# Patient Record
Sex: MALE | Race: WHITE | NOT HISPANIC OR LATINO | Employment: OTHER | URBAN - NONMETROPOLITAN AREA
[De-identification: names, ages, dates, MRNs, and addresses within clinical notes are randomized per-mention and may not be internally consistent; named-entity substitution may affect disease eponyms.]

---

## 2017-02-21 RX ORDER — BETAXOLOL HYDROCHLORIDE 5 MG/ML
2 SOLUTION/ DROPS OPHTHALMIC ONCE
Status: CANCELLED | OUTPATIENT
Start: 2017-02-21 | End: 2017-02-21

## 2017-02-21 RX ORDER — MELOXICAM 15 MG/1
15 TABLET ORAL DAILY
COMMUNITY

## 2017-02-21 RX ORDER — CYCLOBENZAPRINE HCL 10 MG
10 TABLET ORAL 3 TIMES DAILY PRN
COMMUNITY

## 2017-02-28 ENCOUNTER — ANESTHESIA (OUTPATIENT)
Dept: PERIOP | Facility: HOSPITAL | Age: 52
End: 2017-02-28

## 2017-02-28 ENCOUNTER — ANESTHESIA EVENT (OUTPATIENT)
Dept: PERIOP | Facility: HOSPITAL | Age: 52
End: 2017-02-28

## 2017-02-28 ENCOUNTER — HOSPITAL ENCOUNTER (OUTPATIENT)
Facility: HOSPITAL | Age: 52
Setting detail: HOSPITAL OUTPATIENT SURGERY
Discharge: HOME | End: 2017-02-28
Attending: OPHTHALMOLOGY | Admitting: OPHTHALMOLOGY

## 2017-02-28 VITALS
WEIGHT: 200 LBS | HEIGHT: 68 IN | BODY MASS INDEX: 30.31 KG/M2 | OXYGEN SATURATION: 97 % | SYSTOLIC BLOOD PRESSURE: 133 MMHG | HEART RATE: 81 BPM | TEMPERATURE: 98.2 F | RESPIRATION RATE: 18 BRPM | DIASTOLIC BLOOD PRESSURE: 81 MMHG

## 2017-02-28 PROBLEM — H26.9 CATARACT OF RIGHT EYE: Status: ACTIVE | Noted: 2017-02-28

## 2017-02-28 PROBLEM — H26.9 CATARACT OF RIGHT EYE: Status: RESOLVED | Noted: 2017-02-28 | Resolved: 2017-02-28

## 2017-02-28 LAB — GLUCOSE BLDC GLUCOMTR-MCNC: 124 MG/DL (ref 70–130)

## 2017-02-28 PROCEDURE — 25010000002 MIDAZOLAM PER 1 MG: Performed by: NURSE ANESTHETIST, CERTIFIED REGISTERED

## 2017-02-28 PROCEDURE — 82962 GLUCOSE BLOOD TEST: CPT

## 2017-02-28 PROCEDURE — 25010000002 EPINEPHRINE 1 MG/ML SOLUTION: Performed by: OPHTHALMOLOGY

## 2017-02-28 PROCEDURE — C1780 LENS, INTRAOCULAR (NEW TECH): HCPCS | Performed by: OPHTHALMOLOGY

## 2017-02-28 DEVICE — LENS ACRYSOF IQ 6X13MM SN60WF 19.5: Type: IMPLANTABLE DEVICE | Site: POSTERIOR CHAMBER | Status: FUNCTIONAL

## 2017-02-28 RX ORDER — SODIUM CHLORIDE 0.9 % (FLUSH) 0.9 %
1-10 SYRINGE (ML) INJECTION AS NEEDED
Status: DISCONTINUED | OUTPATIENT
Start: 2017-02-28 | End: 2017-02-28 | Stop reason: HOSPADM

## 2017-02-28 RX ORDER — PHENYLEPHRINE HYDROCHLORIDE 100 MG/ML
SOLUTION/ DROPS OPHTHALMIC
Status: COMPLETED
Start: 2017-02-28 | End: 2017-02-28

## 2017-02-28 RX ORDER — BALANCED SALT SOLUTION 6.4; .75; .48; .3; 3.9; 1.7 MG/ML; MG/ML; MG/ML; MG/ML; MG/ML; MG/ML
SOLUTION OPHTHALMIC AS NEEDED
Status: DISCONTINUED | OUTPATIENT
Start: 2017-02-28 | End: 2017-02-28 | Stop reason: HOSPADM

## 2017-02-28 RX ORDER — BALANCED SALT SOLUTION 6.4; .75; .48; .3; 3.9; 1.7 MG/ML; MG/ML; MG/ML; MG/ML; MG/ML; MG/ML
SOLUTION OPHTHALMIC
Status: DISCONTINUED
Start: 2017-02-28 | End: 2017-02-28 | Stop reason: HOSPADM

## 2017-02-28 RX ORDER — MIDAZOLAM HYDROCHLORIDE 1 MG/ML
INJECTION INTRAMUSCULAR; INTRAVENOUS AS NEEDED
Status: DISCONTINUED | OUTPATIENT
Start: 2017-02-28 | End: 2017-02-28 | Stop reason: SURG

## 2017-02-28 RX ORDER — PREDNISOLONE ACETATE 10 MG/ML
SUSPENSION/ DROPS OPHTHALMIC
Status: DISCONTINUED
Start: 2017-02-28 | End: 2017-02-28 | Stop reason: HOSPADM

## 2017-02-28 RX ORDER — SODIUM CHLORIDE 0.9 % (FLUSH) 0.9 %
3 SYRINGE (ML) INJECTION AS NEEDED
Status: DISCONTINUED | OUTPATIENT
Start: 2017-02-28 | End: 2017-02-28 | Stop reason: HOSPADM

## 2017-02-28 RX ORDER — LIDOCAINE HYDROCHLORIDE 40 MG/ML
INJECTION, SOLUTION RETROBULBAR; TOPICAL AS NEEDED
Status: DISCONTINUED | OUTPATIENT
Start: 2017-02-28 | End: 2017-02-28 | Stop reason: HOSPADM

## 2017-02-28 RX ORDER — LIDOCAINE HYDROCHLORIDE 40 MG/ML
INJECTION, SOLUTION RETROBULBAR; TOPICAL
Status: DISCONTINUED
Start: 2017-02-28 | End: 2017-02-28 | Stop reason: HOSPADM

## 2017-02-28 RX ORDER — ENALAPRILAT 2.5 MG/2ML
1.25 INJECTION INTRAVENOUS ONCE AS NEEDED
Status: DISCONTINUED | OUTPATIENT
Start: 2017-02-28 | End: 2017-02-28 | Stop reason: HOSPADM

## 2017-02-28 RX ORDER — PILOCARPINE HYDROCHLORIDE 10 MG/ML
SOLUTION/ DROPS OPHTHALMIC
Status: DISCONTINUED
Start: 2017-02-28 | End: 2017-02-28 | Stop reason: HOSPADM

## 2017-02-28 RX ORDER — TETRACAINE HYDROCHLORIDE 5 MG/ML
SOLUTION OPHTHALMIC
Status: COMPLETED
Start: 2017-02-28 | End: 2017-02-28

## 2017-02-28 RX ORDER — EPINEPHRINE 1 MG/ML
INJECTION INTRAMUSCULAR; INTRAVENOUS; SUBCUTANEOUS AS NEEDED
Status: DISCONTINUED | OUTPATIENT
Start: 2017-02-28 | End: 2017-02-28 | Stop reason: HOSPADM

## 2017-02-28 RX ORDER — TETRACAINE HYDROCHLORIDE 5 MG/ML
1 SOLUTION OPHTHALMIC SEE ADMIN INSTRUCTIONS
Status: COMPLETED | OUTPATIENT
Start: 2017-02-28 | End: 2017-02-28

## 2017-02-28 RX ORDER — PREDNISOLONE ACETATE 10 MG/ML
1 SUSPENSION/ DROPS OPHTHALMIC
Status: DISCONTINUED | OUTPATIENT
Start: 2017-02-28 | End: 2017-02-28 | Stop reason: HOSPADM

## 2017-02-28 RX ORDER — PREDNISOLONE ACETATE 10 MG/ML
SUSPENSION/ DROPS OPHTHALMIC AS NEEDED
Status: DISCONTINUED | OUTPATIENT
Start: 2017-02-28 | End: 2017-02-28 | Stop reason: HOSPADM

## 2017-02-28 RX ORDER — PHENYLEPHRINE HYDROCHLORIDE 100 MG/ML
1 SOLUTION/ DROPS OPHTHALMIC
Status: COMPLETED | OUTPATIENT
Start: 2017-02-28 | End: 2017-02-28

## 2017-02-28 RX ORDER — CYCLOPENTOLATE HYDROCHLORIDE 20 MG/ML
SOLUTION/ DROPS OPHTHALMIC
Status: COMPLETED
Start: 2017-02-28 | End: 2017-02-28

## 2017-02-28 RX ORDER — CYCLOPENTOLATE HYDROCHLORIDE 20 MG/ML
1 SOLUTION/ DROPS OPHTHALMIC
Status: COMPLETED | OUTPATIENT
Start: 2017-02-28 | End: 2017-02-28

## 2017-02-28 RX ORDER — PILOCARPINE HYDROCHLORIDE 10 MG/ML
SOLUTION/ DROPS OPHTHALMIC AS NEEDED
Status: DISCONTINUED | OUTPATIENT
Start: 2017-02-28 | End: 2017-02-28 | Stop reason: HOSPADM

## 2017-02-28 RX ORDER — SODIUM CHLORIDE, SODIUM LACTATE, POTASSIUM CHLORIDE, CALCIUM CHLORIDE 600; 310; 30; 20 MG/100ML; MG/100ML; MG/100ML; MG/100ML
1000 INJECTION, SOLUTION INTRAVENOUS CONTINUOUS PRN
Status: DISCONTINUED | OUTPATIENT
Start: 2017-02-28 | End: 2017-02-28 | Stop reason: HOSPADM

## 2017-02-28 RX ADMIN — PHENYLEPHRINE HYDROCHLORIDE 1 DROP: 100 SOLUTION/ DROPS OPHTHALMIC at 10:57

## 2017-02-28 RX ADMIN — MIDAZOLAM HYDROCHLORIDE 1 MG: 1 INJECTION, SOLUTION INTRAMUSCULAR; INTRAVENOUS at 11:45

## 2017-02-28 RX ADMIN — CYCLOPENTOLATE HYDROCHLORIDE 1 DROP: 20 SOLUTION/ DROPS OPHTHALMIC at 10:57

## 2017-02-28 RX ADMIN — TETRACAINE HYDROCHLORIDE 1 DROP: 5 SOLUTION OPHTHALMIC at 10:51

## 2017-02-28 RX ADMIN — PHENYLEPHRINE HYDROCHLORIDE 1 DROP: 100 SOLUTION/ DROPS OPHTHALMIC at 10:52

## 2017-02-28 RX ADMIN — TETRACAINE HYDROCHLORIDE 1 DROP: 5 SOLUTION OPHTHALMIC at 10:50

## 2017-02-28 RX ADMIN — SODIUM CHLORIDE, POTASSIUM CHLORIDE, SODIUM LACTATE AND CALCIUM CHLORIDE: 600; 310; 30; 20 INJECTION, SOLUTION INTRAVENOUS at 11:19

## 2017-02-28 RX ADMIN — MIDAZOLAM HYDROCHLORIDE 1 MG: 1 INJECTION, SOLUTION INTRAMUSCULAR; INTRAVENOUS at 11:34

## 2017-02-28 RX ADMIN — CYCLOPENTOLATE HYDROCHLORIDE 1 DROP: 20 SOLUTION/ DROPS OPHTHALMIC at 11:02

## 2017-02-28 RX ADMIN — MIDAZOLAM HYDROCHLORIDE 1 MG: 1 INJECTION, SOLUTION INTRAMUSCULAR; INTRAVENOUS at 11:30

## 2017-02-28 RX ADMIN — CYCLOPENTOLATE HYDROCHLORIDE 1 DROP: 20 SOLUTION/ DROPS OPHTHALMIC at 10:52

## 2017-02-28 RX ADMIN — MIDAZOLAM HYDROCHLORIDE 1 MG: 1 INJECTION, SOLUTION INTRAMUSCULAR; INTRAVENOUS at 11:25

## 2017-02-28 RX ADMIN — SODIUM CHLORIDE, POTASSIUM CHLORIDE, SODIUM LACTATE AND CALCIUM CHLORIDE 1000 ML: 600; 310; 30; 20 INJECTION, SOLUTION INTRAVENOUS at 10:56

## 2017-02-28 RX ADMIN — PHENYLEPHRINE HYDROCHLORIDE 1 DROP: 100 SOLUTION/ DROPS OPHTHALMIC at 11:02

## 2017-02-28 NOTE — ANESTHESIA POSTPROCEDURE EVALUATION
Patient: Edy Pinto    Procedure Summary     Date Anesthesia Start Anesthesia Stop Room / Location    02/28/17 1120 1148  [facility]       Procedure Diagnosis Surgeon Provider    RIGHT CATARACT PHACO EXTRACTION WITH INTRAOCULAR LENS IMPLANT (Right Eye) No diagnosis on file. MD Onur Mathias CRNA          Anesthesia Type: MAC  Last vitals  BP      Temp      Pulse     Resp      SpO2        Post Anesthesia Care and Evaluation    Patient location during evaluation: bedside  Patient participation: complete - patient participated  Level of consciousness: awake and alert  Pain score: 0  Pain management: satisfactory to patient  Airway patency: patent  Anesthetic complications: No anesthetic complications  PONV Status: none  Cardiovascular status: acceptable and hemodynamically stable  Respiratory status: acceptable  Hydration status: acceptable

## 2017-02-28 NOTE — ANESTHESIA PREPROCEDURE EVALUATION
Anesthesia Evaluation     Patient summary reviewed and Nursing notes reviewed   no history of anesthetic complications:  NPO Status: > 8 hours   Airway   Mallampati: II  TM distance: <3 FB  no difficulty expected and possible difficult intubation  Dental    (+) edentulous    Pulmonary    (+) a smoker Former, COPD mild, asthma ( as child), shortness of breath, decreased breath sounds,   Cardiovascular - normal exam  Exercise tolerance: good (4-7 METS)        Neuro/Psych  GI/Hepatic/Renal/Endo    (+) obesity,      Musculoskeletal     (+) arthralgias, back pain, myalgias,   Abdominal  - normal exam   Substance History      OB/GYN          Other   (+) arthritis     ROS/Med Hx Other: fbs 124                            Anesthesia Plan    ASA 3     MAC     intravenous induction   Anesthetic plan and risks discussed with patient.

## 2017-03-03 NOTE — OP NOTE
DATE OF PROCEDURE:  02/28/2017    PREOPERATIVE DIAGNOSIS: Right posterior subcapsular cataract.    POSTOPERATIVE DIAGNOSIS: Right posterior subcapsular cataract.    PROCEDURE PERFORMED: Phacoemulsification with implantation of a 19.5 diopter foldable posterior chamber intraocular lens, right eye.    SURGEON:  Karlene Castellanos MD     INDICATIONS:  The patient is a 52-year-old male with a right posterior subcapsular cataract with a preoperative visual acuity of 20/400. The patient desires better vision and is brought to the operating room at this time for elective cataract extraction with planned IOL.      DESCRIPTION OF PROCEDURE:  The patient was brought to the operating room in the fasting state. Once all the vital signs were established to be within normal limits and supplemental oxygen was given, the area of the operative eye was prepped and draped in the usual fashion.  A wire lid speculum was inserted into the cul-de-sac. Additional topical anesthetic drops were instilled. A fornix-based conjunctival flap was performed from the 11-oclock to 1-o'clock position. A stab incision was made in the peripheral cornea with the #75 Micro-Sharp blade. Next 0.3 mL of 1% unpreserved Xylocaine was injected in the anterior chamber. The anterior chamber was then entered with a 2.4 mm keratome blade and then under viscoelastic a capsulotomy was performed using the disposable cystotome in a continuous curvilinear fashion.  The anterior capsule was then removed and the phacoemulsification handpiece was then introduced into the anterior segment and the nucleus was emulsified without difficulty. The CDE was 10.20. Once this was accomplished, the irrigation and aspiration handpiece was then used to aspirate the residual cortex. The posterior capsule was cleaned of any residual material and then polished with the irrigation and aspiration handpiece and the viscoelastic was used to inflate the capsular bag.  The Anoop AcrySof  implant, style [id number] lens was then loaded into the microcartridge. It measured 19.5 diopters and the serial number was [id number]. The injector was then introduced into the capsular bag and the implant was slowly implanted without difficulty.  The lens was then rotated to the horizontal position and the viscoelastic was aspirated. The conjunctiva was then repositioned. Topical Betoptic-S, pilocarpine, and Pred-Forte drops were applied. Polysporin ointment was then instilled. Chilled BSS was used as the irrigating solution. There were no anesthetic or surgical difficulties. The patient tolerated the procedure very well and was returned to same day surgery in satisfactory condition.      Karlene Castellanos M.D.  D: MARNIE 03/02/2017 00:00:00  T: elio 03/02/2017 19:28:27  Job ID: [id number]  Document ID: [id number]

## 2017-03-28 ENCOUNTER — ANESTHESIA EVENT (OUTPATIENT)
Dept: PERIOP | Facility: HOSPITAL | Age: 52
End: 2017-03-28

## 2017-03-28 ENCOUNTER — ANESTHESIA (OUTPATIENT)
Dept: PERIOP | Facility: HOSPITAL | Age: 52
End: 2017-03-28

## 2017-03-28 ENCOUNTER — HOSPITAL ENCOUNTER (OUTPATIENT)
Facility: HOSPITAL | Age: 52
Setting detail: HOSPITAL OUTPATIENT SURGERY
Discharge: HOME | End: 2017-03-28
Attending: OPHTHALMOLOGY | Admitting: OPHTHALMOLOGY

## 2017-03-28 VITALS
WEIGHT: 200 LBS | HEIGHT: 68 IN | RESPIRATION RATE: 18 BRPM | DIASTOLIC BLOOD PRESSURE: 84 MMHG | OXYGEN SATURATION: 98 % | HEART RATE: 80 BPM | BODY MASS INDEX: 30.31 KG/M2 | TEMPERATURE: 98.1 F | SYSTOLIC BLOOD PRESSURE: 142 MMHG

## 2017-03-28 PROBLEM — H26.9 CATARACT, LEFT EYE: Status: ACTIVE | Noted: 2017-03-28

## 2017-03-28 PROBLEM — H26.9 CATARACT, LEFT EYE: Status: RESOLVED | Noted: 2017-03-28 | Resolved: 2017-03-28

## 2017-03-28 LAB — GLUCOSE BLDC GLUCOMTR-MCNC: 140 MG/DL (ref 70–130)

## 2017-03-28 PROCEDURE — C1780 LENS, INTRAOCULAR (NEW TECH): HCPCS | Performed by: OPHTHALMOLOGY

## 2017-03-28 PROCEDURE — 25010000002 EPINEPHRINE 1 MG/ML SOLUTION: Performed by: OPHTHALMOLOGY

## 2017-03-28 PROCEDURE — 82962 GLUCOSE BLOOD TEST: CPT

## 2017-03-28 PROCEDURE — 25010000002 MIDAZOLAM PER 1 MG: Performed by: NURSE ANESTHETIST, CERTIFIED REGISTERED

## 2017-03-28 DEVICE — LENS ACRYSOF IQ 6X13MM SN60WF 19.5: Type: IMPLANTABLE DEVICE | Site: POSTERIOR CHAMBER | Status: FUNCTIONAL

## 2017-03-28 RX ORDER — TETRACAINE HYDROCHLORIDE 5 MG/ML
SOLUTION OPHTHALMIC
Status: COMPLETED
Start: 2017-03-28 | End: 2017-03-28

## 2017-03-28 RX ORDER — CYCLOPENTOLATE HYDROCHLORIDE 20 MG/ML
SOLUTION/ DROPS OPHTHALMIC
Status: COMPLETED
Start: 2017-03-28 | End: 2017-03-28

## 2017-03-28 RX ORDER — ENALAPRILAT 2.5 MG/2ML
1.25 INJECTION INTRAVENOUS ONCE AS NEEDED
Status: DISCONTINUED | OUTPATIENT
Start: 2017-03-28 | End: 2017-03-28 | Stop reason: HOSPADM

## 2017-03-28 RX ORDER — SODIUM CHLORIDE, SODIUM LACTATE, POTASSIUM CHLORIDE, CALCIUM CHLORIDE 600; 310; 30; 20 MG/100ML; MG/100ML; MG/100ML; MG/100ML
1000 INJECTION, SOLUTION INTRAVENOUS CONTINUOUS PRN
Status: DISCONTINUED | OUTPATIENT
Start: 2017-03-28 | End: 2017-03-28 | Stop reason: HOSPADM

## 2017-03-28 RX ORDER — PHENYLEPHRINE HYDROCHLORIDE 100 MG/ML
1 SOLUTION/ DROPS OPHTHALMIC
Status: COMPLETED | OUTPATIENT
Start: 2017-03-28 | End: 2017-03-28

## 2017-03-28 RX ORDER — PILOCARPINE HYDROCHLORIDE 10 MG/ML
SOLUTION/ DROPS OPHTHALMIC AS NEEDED
Status: DISCONTINUED | OUTPATIENT
Start: 2017-03-28 | End: 2017-03-28 | Stop reason: HOSPADM

## 2017-03-28 RX ORDER — CYCLOPENTOLATE HYDROCHLORIDE 20 MG/ML
1 SOLUTION/ DROPS OPHTHALMIC
Status: COMPLETED | OUTPATIENT
Start: 2017-03-28 | End: 2017-03-28

## 2017-03-28 RX ORDER — PHENYLEPHRINE HYDROCHLORIDE 100 MG/ML
SOLUTION/ DROPS OPHTHALMIC
Status: COMPLETED
Start: 2017-03-28 | End: 2017-03-28

## 2017-03-28 RX ORDER — PREDNISOLONE ACETATE 10 MG/ML
1 SUSPENSION/ DROPS OPHTHALMIC
Status: DISCONTINUED | OUTPATIENT
Start: 2017-03-28 | End: 2017-03-28 | Stop reason: HOSPADM

## 2017-03-28 RX ORDER — EPINEPHRINE 1 MG/ML
INJECTION INTRAMUSCULAR; INTRAVENOUS; SUBCUTANEOUS AS NEEDED
Status: DISCONTINUED | OUTPATIENT
Start: 2017-03-28 | End: 2017-03-28 | Stop reason: HOSPADM

## 2017-03-28 RX ORDER — PREDNISOLONE ACETATE 10 MG/ML
SUSPENSION/ DROPS OPHTHALMIC
Status: DISCONTINUED
Start: 2017-03-28 | End: 2017-03-28 | Stop reason: HOSPADM

## 2017-03-28 RX ORDER — SODIUM CHLORIDE 0.9 % (FLUSH) 0.9 %
3 SYRINGE (ML) INJECTION AS NEEDED
Status: DISCONTINUED | OUTPATIENT
Start: 2017-03-28 | End: 2017-03-28 | Stop reason: HOSPADM

## 2017-03-28 RX ORDER — SODIUM CHLORIDE 0.9 % (FLUSH) 0.9 %
1-10 SYRINGE (ML) INJECTION AS NEEDED
Status: DISCONTINUED | OUTPATIENT
Start: 2017-03-28 | End: 2017-03-28 | Stop reason: HOSPADM

## 2017-03-28 RX ORDER — PREDNISOLONE ACETATE 10 MG/ML
SUSPENSION/ DROPS OPHTHALMIC AS NEEDED
Status: DISCONTINUED | OUTPATIENT
Start: 2017-03-28 | End: 2017-03-28 | Stop reason: HOSPADM

## 2017-03-28 RX ORDER — TETRACAINE HYDROCHLORIDE 5 MG/ML
1 SOLUTION OPHTHALMIC SEE ADMIN INSTRUCTIONS
Status: DISCONTINUED | OUTPATIENT
Start: 2017-03-28 | End: 2017-03-28 | Stop reason: HOSPADM

## 2017-03-28 RX ORDER — LIDOCAINE HYDROCHLORIDE 40 MG/ML
INJECTION, SOLUTION RETROBULBAR; TOPICAL AS NEEDED
Status: DISCONTINUED | OUTPATIENT
Start: 2017-03-28 | End: 2017-03-28 | Stop reason: HOSPADM

## 2017-03-28 RX ORDER — MIDAZOLAM HYDROCHLORIDE 1 MG/ML
INJECTION INTRAMUSCULAR; INTRAVENOUS AS NEEDED
Status: DISCONTINUED | OUTPATIENT
Start: 2017-03-28 | End: 2017-03-28 | Stop reason: SURG

## 2017-03-28 RX ORDER — BALANCED SALT SOLUTION 6.4; .75; .48; .3; 3.9; 1.7 MG/ML; MG/ML; MG/ML; MG/ML; MG/ML; MG/ML
SOLUTION OPHTHALMIC AS NEEDED
Status: DISCONTINUED | OUTPATIENT
Start: 2017-03-28 | End: 2017-03-28 | Stop reason: HOSPADM

## 2017-03-28 RX ADMIN — CYCLOPENTOLATE HYDROCHLORIDE 1 DROP: 20 SOLUTION/ DROPS OPHTHALMIC at 07:35

## 2017-03-28 RX ADMIN — CYCLOPENTOLATE HYDROCHLORIDE 1 DROP: 20 SOLUTION/ DROPS OPHTHALMIC at 07:25

## 2017-03-28 RX ADMIN — MIDAZOLAM HYDROCHLORIDE 1 MG: 1 INJECTION, SOLUTION INTRAMUSCULAR; INTRAVENOUS at 08:32

## 2017-03-28 RX ADMIN — PHENYLEPHRINE HYDROCHLORIDE 1 DROP: 100 SOLUTION/ DROPS OPHTHALMIC at 07:35

## 2017-03-28 RX ADMIN — CYCLOPENTOLATE HYDROCHLORIDE 1 DROP: 20 SOLUTION/ DROPS OPHTHALMIC at 07:30

## 2017-03-28 RX ADMIN — MIDAZOLAM HYDROCHLORIDE 1 MG: 1 INJECTION, SOLUTION INTRAMUSCULAR; INTRAVENOUS at 08:34

## 2017-03-28 RX ADMIN — TETRACAINE HYDROCHLORIDE 1 DROP: 5 SOLUTION OPHTHALMIC at 07:23

## 2017-03-28 RX ADMIN — PHENYLEPHRINE HYDROCHLORIDE 1 DROP: 100 SOLUTION/ DROPS OPHTHALMIC at 07:30

## 2017-03-28 RX ADMIN — PHENYLEPHRINE HYDROCHLORIDE 1 DROP: 100 SOLUTION/ DROPS OPHTHALMIC at 07:25

## 2017-03-28 RX ADMIN — SODIUM CHLORIDE, POTASSIUM CHLORIDE, SODIUM LACTATE AND CALCIUM CHLORIDE 1000 ML: 600; 310; 30; 20 INJECTION, SOLUTION INTRAVENOUS at 07:39

## 2017-03-28 NOTE — ANESTHESIA POSTPROCEDURE EVALUATION
Patient: Edy Pinto    Procedure Summary     Date Anesthesia Start Anesthesia Stop Room / Location    03/28/17 0815 0844  [facility]       Procedure Diagnosis Surgeon Provider    LEFT CATARACT PHACO EXTRACTION WITH INTRAOCULAR LENS IMPLANT (Left Eye) No diagnosis on file. MD Ethel Mathias CRNA          Anesthesia Type: MAC  Last vitals  /84 (03/28/17 0910)    Temp 98.1 °F (36.7 °C) (03/28/17 0850)    Pulse 80 (03/28/17 0910)   Resp 18 (03/28/17 0910)    SpO2 98 % (03/28/17 0910)      Post Anesthesia Care and Evaluation    Patient location during evaluation: PHASE II  Patient participation: complete - patient participated  Level of consciousness: awake and alert  Pain score: 0  Pain management: satisfactory to patient  Airway patency: patent  Anesthetic complications: No anesthetic complications  PONV Status: none  Cardiovascular status: acceptable and stable  Respiratory status: acceptable  Hydration status: acceptable

## 2017-03-28 NOTE — ANESTHESIA PREPROCEDURE EVALUATION
Anesthesia Evaluation     Patient summary reviewed and Nursing notes reviewed   no history of anesthetic complications:  NPO Status: > 8 hours   Airway   Mallampati: II  TM distance: <3 FB  Dental    (+) edentulous    Pulmonary    (+) a smoker (quit 11 years ago) Former, COPD mild, asthma, shortness of breath, decreased breath sounds,   Cardiovascular - normal exam  Exercise tolerance: good (4-7 METS)    (-) hypertension, valvular problems/murmurs, past MI, cardiac stents, hyperlipidemia      Neuro/Psych  GI/Hepatic/Renal/Endo    (+) obesity,  diabetes mellitus (FSBS 140) type 2 well controlled,     Musculoskeletal     (+) arthralgias, back pain, myalgias,   Abdominal  - normal exam   Substance History      OB/GYN          Other   (+) arthritis                               Anesthesia Plan    ASA 3     MAC   (Risks and benefits discussed including risk of aspiration, recall and dental damage. All patient questions answered. Will continue with POC.)  intravenous induction   Anesthetic plan and risks discussed with patient.

## 2017-04-07 NOTE — OP NOTE
DATE OF PROCEDURE:  03/28/2017    PREOPERATIVE DIAGNOSIS: Left posterior subcapsular cataract.    POSTOPERATIVE DIAGNOSIS: Left posterior subcapsular cataract.    PROCEDURE PERFORMED: Phacoemulsification with implantation of a 19.5 diopter foldable posterior chamber intraocular lens, left eye.    SURGEON:  Karlene Castellanos MD     INDICATIONS:  The patient is a 52-year-old male with a left posterior subcapsular cataract with a preoperative visual acuity of 20/150. The patient desires better vision and is brought to the operating room at this time for elective cataract extraction with planned IOL.      DESCRIPTION OF PROCEDURE:  The patient was brought to the operating room in the fasting state. Once all the vital signs were established to be within normal limits and supplemental oxygen was given, the area of the operative eye was prepped and draped in the usual fashion.  A wire lid speculum was inserted into the cul-de-sac. Additional topical anesthetic drops were instilled. A fornix-based conjunctival flap was performed from the 11-oclock to 1-o'clock position. A stab incision was made in the peripheral cornea with the #75 Micro-Sharp blade. Next 0.3 mL of 1% unpreserved Xylocaine was injected in the anterior chamber. The anterior chamber was then entered with a 2.4 mm keratome blade and then under viscoelastic a capsulotomy was performed using the disposable cystotome in a continuous curvilinear fashion.  The anterior capsule was then removed and the phacoemulsification handpiece was then introduced into the anterior segment and the nucleus was emulsified without difficulty. The CDE was 9.45. Once this was accomplished, the irrigation and aspiration handpiece was then used to aspirate the residual cortex. The posterior capsule was cleaned of any residual material and then polished with the irrigation and aspiration handpiece and the viscoelastic was used to inflate the capsular bag.  The Anoop AcrySof implant,  style [id number] lens was then loaded into the microcartridge. It measured 19.5 diopters and the serial number was [id number]. The injector was then introduced into the capsular bag and the implant was slowly implanted without difficulty.  The lens was then rotated to the horizontal position and the viscoelastic was aspirated. The conjunctiva was then repositioned. Topical Betoptic-S, pilocarpine, and Pred-Forte drops were applied. Polysporin ointment was then instilled. Chilled BSS was used as the irrigating solution. There were no anesthetic or surgical difficulties. The patient tolerated the procedure very well and was returned to same day surgery in satisfactory condition.      Karlene Castellanos M.D.  D: MARNIE 04/06/2017 00:00:00  T: elio 04/06/2017 21:25:19  Job ID: [id number]  Document ID: [id number]

## 2023-07-28 ENCOUNTER — HOSPITAL ENCOUNTER (EMERGENCY)
Facility: HOSPITAL | Age: 58
Discharge: HOME | End: 2023-07-28
Attending: STUDENT IN AN ORGANIZED HEALTH CARE EDUCATION/TRAINING PROGRAM
Payer: COMMERCIAL

## 2023-07-28 ENCOUNTER — APPOINTMENT (OUTPATIENT)
Dept: CT IMAGING | Facility: HOSPITAL | Age: 58
End: 2023-07-28
Payer: COMMERCIAL

## 2023-07-28 VITALS
DIASTOLIC BLOOD PRESSURE: 84 MMHG | RESPIRATION RATE: 18 BRPM | HEART RATE: 102 BPM | OXYGEN SATURATION: 95 % | HEIGHT: 67 IN | TEMPERATURE: 98.3 F | SYSTOLIC BLOOD PRESSURE: 142 MMHG | BODY MASS INDEX: 36.1 KG/M2 | WEIGHT: 230 LBS

## 2023-07-28 DIAGNOSIS — K52.9 COLITIS: Primary | ICD-10-CM

## 2023-07-28 LAB
ALBUMIN SERPL-MCNC: 4.6 G/DL (ref 3.5–5.2)
ALBUMIN/GLOB SERPL: 1.4 G/DL
ALP SERPL-CCNC: 108 U/L (ref 39–117)
ALT SERPL W P-5'-P-CCNC: 33 U/L (ref 1–41)
ANION GAP SERPL CALCULATED.3IONS-SCNC: 17.1 MMOL/L (ref 5–15)
AST SERPL-CCNC: 23 U/L (ref 1–40)
BASOPHILS # BLD AUTO: 0.08 10*3/MM3 (ref 0–0.2)
BASOPHILS NFR BLD AUTO: 0.5 % (ref 0–1.5)
BILIRUB SERPL-MCNC: 0.7 MG/DL (ref 0–1.2)
BILIRUB UR QL STRIP: NEGATIVE
BUN SERPL-MCNC: 29 MG/DL (ref 6–20)
BUN/CREAT SERPL: 18.4 (ref 7–25)
CALCIUM SPEC-SCNC: 9.1 MG/DL (ref 8.6–10.5)
CHLORIDE SERPL-SCNC: 101 MMOL/L (ref 98–107)
CLARITY UR: CLEAR
CO2 SERPL-SCNC: 18.9 MMOL/L (ref 22–29)
COLOR UR: YELLOW
CREAT SERPL-MCNC: 1.58 MG/DL (ref 0.76–1.27)
DEPRECATED RDW RBC AUTO: 39.9 FL (ref 37–54)
EGFRCR SERPLBLD CKD-EPI 2021: 50.7 ML/MIN/1.73
EOSINOPHIL # BLD AUTO: 0.35 10*3/MM3 (ref 0–0.4)
EOSINOPHIL NFR BLD AUTO: 2 % (ref 0.3–6.2)
ERYTHROCYTE [DISTWIDTH] IN BLOOD BY AUTOMATED COUNT: 12.7 % (ref 12.3–15.4)
FLUAV RNA RESP QL NAA+PROBE: NOT DETECTED
FLUBV RNA RESP QL NAA+PROBE: NOT DETECTED
GLOBULIN UR ELPH-MCNC: 3.2 GM/DL
GLUCOSE SERPL-MCNC: 314 MG/DL (ref 65–99)
GLUCOSE UR STRIP-MCNC: ABNORMAL MG/DL
HCT VFR BLD AUTO: 44 % (ref 37.5–51)
HGB BLD-MCNC: 15.3 G/DL (ref 13–17.7)
HGB UR QL STRIP.AUTO: NEGATIVE
HOLD SPECIMEN: NORMAL
HOLD SPECIMEN: NORMAL
IMM GRANULOCYTES # BLD AUTO: 0.07 10*3/MM3 (ref 0–0.05)
IMM GRANULOCYTES NFR BLD AUTO: 0.4 % (ref 0–0.5)
KETONES UR QL STRIP: ABNORMAL
LEUKOCYTE ESTERASE UR QL STRIP.AUTO: NEGATIVE
LIPASE SERPL-CCNC: 384 U/L (ref 13–60)
LYMPHOCYTES # BLD AUTO: 1.17 10*3/MM3 (ref 0.7–3.1)
LYMPHOCYTES NFR BLD AUTO: 6.6 % (ref 19.6–45.3)
MCH RBC QN AUTO: 29.9 PG (ref 26.6–33)
MCHC RBC AUTO-ENTMCNC: 34.8 G/DL (ref 31.5–35.7)
MCV RBC AUTO: 85.9 FL (ref 79–97)
MONOCYTES # BLD AUTO: 0.88 10*3/MM3 (ref 0.1–0.9)
MONOCYTES NFR BLD AUTO: 5 % (ref 5–12)
NEUTROPHILS NFR BLD AUTO: 15.14 10*3/MM3 (ref 1.7–7)
NEUTROPHILS NFR BLD AUTO: 85.5 % (ref 42.7–76)
NITRITE UR QL STRIP: NEGATIVE
NRBC BLD AUTO-RTO: 0 /100 WBC (ref 0–0.2)
PH UR STRIP.AUTO: 5.5 [PH] (ref 5–8)
PLATELET # BLD AUTO: 251 10*3/MM3 (ref 140–450)
PMV BLD AUTO: 9.7 FL (ref 6–12)
POTASSIUM SERPL-SCNC: 4.8 MMOL/L (ref 3.5–5.2)
PROT SERPL-MCNC: 7.8 G/DL (ref 6–8.5)
PROT UR QL STRIP: ABNORMAL
RBC # BLD AUTO: 5.12 10*6/MM3 (ref 4.14–5.8)
SARS-COV-2 RNA RESP QL NAA+PROBE: NOT DETECTED
SODIUM SERPL-SCNC: 137 MMOL/L (ref 136–145)
SP GR UR STRIP: 1.02 (ref 1–1.03)
TROPONIN T SERPL HS-MCNC: 13 NG/L
UROBILINOGEN UR QL STRIP: ABNORMAL
WBC NRBC COR # BLD: 17.69 10*3/MM3 (ref 3.4–10.8)
WHOLE BLOOD HOLD COAG: NORMAL
WHOLE BLOOD HOLD SPECIMEN: NORMAL

## 2023-07-28 PROCEDURE — 87636 SARSCOV2 & INF A&B AMP PRB: CPT | Performed by: PHYSICIAN ASSISTANT

## 2023-07-28 PROCEDURE — 25510000001 IOPAMIDOL 61 % SOLUTION: Performed by: STUDENT IN AN ORGANIZED HEALTH CARE EDUCATION/TRAINING PROGRAM

## 2023-07-28 PROCEDURE — 25010000002 ONDANSETRON PER 1 MG: Performed by: PHYSICIAN ASSISTANT

## 2023-07-28 PROCEDURE — 81003 URINALYSIS AUTO W/O SCOPE: CPT | Performed by: PHYSICIAN ASSISTANT

## 2023-07-28 PROCEDURE — 85025 COMPLETE CBC W/AUTO DIFF WBC: CPT | Performed by: PHYSICIAN ASSISTANT

## 2023-07-28 PROCEDURE — 93005 ELECTROCARDIOGRAM TRACING: CPT | Performed by: STUDENT IN AN ORGANIZED HEALTH CARE EDUCATION/TRAINING PROGRAM

## 2023-07-28 PROCEDURE — 74177 CT ABD & PELVIS W/CONTRAST: CPT

## 2023-07-28 PROCEDURE — 80053 COMPREHEN METABOLIC PANEL: CPT | Performed by: PHYSICIAN ASSISTANT

## 2023-07-28 PROCEDURE — 96374 THER/PROPH/DIAG INJ IV PUSH: CPT

## 2023-07-28 PROCEDURE — 36415 COLL VENOUS BLD VENIPUNCTURE: CPT

## 2023-07-28 PROCEDURE — 96376 TX/PRO/DX INJ SAME DRUG ADON: CPT

## 2023-07-28 PROCEDURE — 83690 ASSAY OF LIPASE: CPT | Performed by: PHYSICIAN ASSISTANT

## 2023-07-28 PROCEDURE — 84484 ASSAY OF TROPONIN QUANT: CPT | Performed by: PHYSICIAN ASSISTANT

## 2023-07-28 PROCEDURE — 99285 EMERGENCY DEPT VISIT HI MDM: CPT

## 2023-07-28 RX ORDER — ONDANSETRON 2 MG/ML
4 INJECTION INTRAMUSCULAR; INTRAVENOUS ONCE
Status: COMPLETED | OUTPATIENT
Start: 2023-07-28 | End: 2023-07-28

## 2023-07-28 RX ORDER — ONDANSETRON 4 MG/1
4 TABLET, ORALLY DISINTEGRATING ORAL EVERY 8 HOURS PRN
Qty: 12 TABLET | Refills: 0 | Status: SHIPPED | OUTPATIENT
Start: 2023-07-28

## 2023-07-28 RX ORDER — SODIUM CHLORIDE 0.9 % (FLUSH) 0.9 %
10 SYRINGE (ML) INJECTION AS NEEDED
Status: DISCONTINUED | OUTPATIENT
Start: 2023-07-28 | End: 2023-07-28 | Stop reason: HOSPADM

## 2023-07-28 RX ADMIN — ONDANSETRON 4 MG: 2 INJECTION INTRAMUSCULAR; INTRAVENOUS at 10:35

## 2023-07-28 RX ADMIN — ONDANSETRON 4 MG: 2 INJECTION INTRAMUSCULAR; INTRAVENOUS at 12:02

## 2023-07-28 RX ADMIN — SODIUM CHLORIDE 1000 ML: 9 INJECTION, SOLUTION INTRAVENOUS at 10:35

## 2023-07-28 RX ADMIN — IOPAMIDOL 100 ML: 612 INJECTION, SOLUTION INTRAVENOUS at 11:11

## 2023-07-28 NOTE — ED PROVIDER NOTES
Subjective:  Chief Complaint:  N/V/D    History of Present Illness:  Patient is a pleasant 58-year-old male with history of arthritis, asthma, diabetes, among others presenting to the ER with complaints of nausea, vomiting, diarrhea that began around 7 this morning.  Patient denies any unusual foods.  Denies recent sick contacts.  Denies chest pain, shortness of breath.  He states he does feel clammy.  Denies any additional symptoms or complaints at this time.  Patient states the only thing unusual is that he did bath his dog with flea and tick shampoo and did not realize he was post to use gloves.  Unsure if this is related.        Nurses Notes reviewed and agree, including vitals, allergies, social history and prior medical history.     REVIEW OF SYSTEMS: All systems reviewed and not pertinent unless noted.  Review of Systems   Constitutional:  Positive for diaphoresis.   Gastrointestinal:  Positive for diarrhea, nausea and vomiting.   All other systems reviewed and are negative.    Past Medical History:   Diagnosis Date    Arthritis     Asthma     AS A CHILD    Cataract     Diabetes mellitus     TAKES ORAL MEDS    Wears glasses        Allergies:    Patient has no known allergies.      Past Surgical History:   Procedure Laterality Date    CATARACT EXTRACTION      SCHEDULED FOR RIGHT IOL FOR 02-28-17    CATARACT EXTRACTION W/ INTRAOCULAR LENS IMPLANT Right 2/28/2017    Procedure: RIGHT CATARACT PHACO EXTRACTION WITH INTRAOCULAR LENS IMPLANT;  Surgeon: Karlene Castellanos MD;  Location: [facility];  Service:     CATARACT EXTRACTION W/ INTRAOCULAR LENS IMPLANT Left 3/28/2017    Procedure: LEFT CATARACT PHACO EXTRACTION WITH INTRAOCULAR LENS IMPLANT;  Surgeon: Karlene Castellanos MD;  Location: [facility];  Service:     KNEE ARTHROSCOPY Right          Social History     Socioeconomic History    Marital status:    Tobacco Use    Smoking status: Former     Packs/day: 1.00     Years: 31.00     Pack  "years: 31.00     Types: Cigarettes    Smokeless tobacco: Never    Tobacco comments:     REPORTS HE QUIT SMOKING 11 YEARS AGO   Substance and Sexual Activity    Alcohol use: No    Drug use: No    Sexual activity: Defer         History reviewed. No pertinent family history.    Objective  Physical Exam:  /74   Pulse 97   Temp 98.3 °F (36.8 °C) (Oral)   Resp 18   Ht 170.2 cm (67\")   Wt 104 kg (230 lb)   SpO2 97%   BMI 36.02 kg/m²      Physical Exam  Vitals and nursing note reviewed.   Constitutional:       General: He is not in acute distress.     Appearance: He is not toxic-appearing.   HENT:      Head: Normocephalic and atraumatic.      Right Ear: External ear normal.      Left Ear: External ear normal.      Nose: Nose normal.   Eyes:      Extraocular Movements: Extraocular movements intact.      Conjunctiva/sclera: Conjunctivae normal.   Cardiovascular:      Rate and Rhythm: Tachycardia present.      Heart sounds: Normal heart sounds.   Pulmonary:      Effort: Pulmonary effort is normal. No respiratory distress.      Breath sounds: Normal breath sounds.   Abdominal:      General: There is no distension.      Palpations: Abdomen is soft.      Tenderness: There is no abdominal tenderness. There is no guarding or rebound.   Musculoskeletal:         General: Normal range of motion.      Cervical back: Normal range of motion and neck supple.   Skin:     General: Skin is warm.      Comments: Diaphoretic   Neurological:      General: No focal deficit present.      Mental Status: He is alert and oriented to person, place, and time.   Psychiatric:         Mood and Affect: Mood normal.         Behavior: Behavior normal.       Procedures    ED Course:    ED Course as of 07/28/23 1150   Fri Jul 28, 2023   1029 EKG interpreted by me, sinus tachycardia with no concerning ST changes noted, rate of 102 [JE]      ED Course User Index  [JE] Taurus Rapp MD       Lab Results (last 24 hours)       Procedure " Component Value Units Date/Time    CBC & Differential [[id number]]  (Abnormal) Collected: 07/28/23 0950    Specimen: Blood Updated: 07/28/23 1022    Narrative:      The following orders were created for panel order CBC & Differential.  Procedure                               Abnormality         Status                     ---------                               -----------         ------                     CBC Auto Differential[[id number]]        Abnormal            Final result                 Please view results for these tests on the individual orders.    Comprehensive Metabolic Panel [[id number]]  (Abnormal) Collected: 07/28/23 0950    Specimen: Blood Updated: 07/28/23 1036     Glucose 314 mg/dL      Comment: Glucose >180, Hemoglobin A1C recommended.        BUN 29 mg/dL      Creatinine 1.58 mg/dL      Sodium 137 mmol/L      Potassium 4.8 mmol/L      Chloride 101 mmol/L      CO2 18.9 mmol/L      Calcium 9.1 mg/dL      Total Protein 7.8 g/dL      Albumin 4.6 g/dL      ALT (SGPT) 33 U/L      AST (SGOT) 23 U/L      Alkaline Phosphatase 108 U/L      Total Bilirubin 0.7 mg/dL      Globulin 3.2 gm/dL      A/G Ratio 1.4 g/dL      BUN/Creatinine Ratio 18.4     Anion Gap 17.1 mmol/L      eGFR 50.7 mL/min/1.73     Narrative:      GFR Normal >60  Chronic Kidney Disease <60  Kidney Failure <15      Lipase [[id number]]  (Abnormal) Collected: 07/28/23 0950    Specimen: Blood Updated: 07/28/23 1043     Lipase 384 U/L     Single High Sensitivity Troponin T [[id number]]  (Normal) Collected: 07/28/23 0950    Specimen: Blood Updated: 07/28/23 1036     HS Troponin T 13 ng/L     Narrative:      High Sensitive Troponin T Reference Range:  <10.0 ng/L- Negative Female for AMI  <15.0 ng/L- Negative Male for AMI  >=10 - Abnormal Female indicating possible myocardial injury.  >=15 - Abnormal Male indicating possible myocardial injury.   Clinicians would have to utilize clinical acumen, EKG, Troponin, and serial changes to determine if it is an  Acute Myocardial Infarction or myocardial injury due to an underlying chronic condition.         CBC Auto Differential [[id number]]  (Abnormal) Collected: 07/28/23 0950    Specimen: Blood Updated: 07/28/23 1022     WBC 17.69 10*3/mm3      RBC 5.12 10*6/mm3      Hemoglobin 15.3 g/dL      Hematocrit 44.0 %      MCV 85.9 fL      MCH 29.9 pg      MCHC 34.8 g/dL      RDW 12.7 %      RDW-SD 39.9 fl      MPV 9.7 fL      Platelets 251 10*3/mm3      Neutrophil % 85.5 %      Lymphocyte % 6.6 %      Monocyte % 5.0 %      Eosinophil % 2.0 %      Basophil % 0.5 %      Immature Grans % 0.4 %      Neutrophils, Absolute 15.14 10*3/mm3      Lymphocytes, Absolute 1.17 10*3/mm3      Monocytes, Absolute 0.88 10*3/mm3      Eosinophils, Absolute 0.35 10*3/mm3      Basophils, Absolute 0.08 10*3/mm3      Immature Grans, Absolute 0.07 10*3/mm3      nRBC 0.0 /100 WBC     COVID-19 and FLU A/B PCR - Swab, Nasopharynx [[id number]]  (Normal) Collected: 07/28/23 1033    Specimen: Swab from Nasopharynx Updated: 07/28/23 1107     COVID19 Not Detected     Influenza A PCR Not Detected     Influenza B PCR Not Detected    Narrative:      Fact sheet for providers: https://www.fda.gov/media/219394/download    Fact sheet for patients: https://www.fda.gov/media/715801/download    Test performed by PCR.    Urinalysis With Microscopic If Indicated (No Culture) - Urine, Clean Catch [[id number]]  (Abnormal) Collected: 07/28/23 1049    Specimen: Urine, Clean Catch Updated: 07/28/23 1057     Color, UA Yellow     Appearance, UA Clear     pH, UA 5.5     Specific Gravity, UA 1.020     Glucose, UA >=1000 mg/dL (3+)     Ketones, UA Trace     Bilirubin, UA Negative     Blood, UA Negative     Protein, UA Trace     Leuk Esterase, UA Negative     Nitrite, UA Negative     Urobilinogen, UA 1.0 E.U./dL    Narrative:      Urine microscopic not indicated.             CT Abdomen Pelvis With Contrast    Result Date: 7/28/2023  CT SCAN OF THE ABDOMEN AND PELVIS WITH CONTRAST     7/28/2023 12:02 PM  HISTORY: elevated lipase, n/v/d since this morningAbdominal pain.  PROCEDURE: Axial CT images were obtained from the lung bases to the pubic symphysis following IV contrast administration. Coronal and sagittal reformatted images were generated from the axial data set and provided for interpretation. This study was performed with techniques to keep radiation doses as low as reasonably achievable, (ALARA). Individualized dose reduction techniques using automated exposure control or adjustment of mA and/or kV according to the patient size were employed.  COMPARISON: None.  FINDINGS: LOWER CHEST: The heart is normal in size. Lung bases are clear.  ABDOMEN/PELVIS: Liver, gallbladder and bile ducts: The liver enhances homogenously without suspicious focal hepatic lesion. Hepatic steatosis. Unremarkable gallbladder. No significant biliary ductal dilatation.  Adrenal glands: The adrenal glands are morphologically unremarkable without suspicious lesion.  Kidneys, ureters and urinary bladder: No suspicious renal lesions. Mildly heterogeneous enhancement of the bilateral renal parenchyma without surrounding inflammatory fat stranding. No hydronephrosis. Unremarkable urinary bladder.  Spleen: The spleen is normal in size.  Pancreas: The pancreas is unremarkable.  Gastrointestinal system and mesentery: There is no evidence of bowel obstruction. The appendix is visualized and unremarkable. Diffuse mesenteric root inflammation with prominent mesenteric lymph nodes. Fluid filled into the large bowel. No evidence of pneumatosis or pneumoperitoneum or portal venous gas.  Lymph nodes: No pathologically enlarged abdominal or pelvic lymph nodes are present.  Vessels: The abdominal aorta is normal in caliber. Aortic and bilateral iliac atherosclerotic disease is present. The celiac trunk, superior mesenteric artery, inferior mesenteric artery and their branch vessels appear grossly patent. The superior mesenteric  vein, splenic vein and main portal veins are patent. The inferior vena cava and hepatic veins are unremarkable.  Peritoneum: No free intraperitoneal fluid or pneumoperitoneum.  Pelvic viscera: No acute findings.  Body wall: No acute findings. No significant body wall hernias.  Bones: No acute fracture.      Impression: Fluid-filled large bowel with mild wall thickening, correlate for possible mild diffuse colitis, likely infectious colitis and less likely inflammatory/ischemic colitis. Mesenteric panniculitis. Mesenteric lymphadenitis.  Hepatic steatosis. Normal gallbladder. Normal appendix.  Mildly heterogeneous enhancement of the both kidneys without surrounding inflammatory fat stranding, possibility of pyelonephritis cannot be excluded, correlate clinically.    Images personally reviewed, interpreted and dictated by ANA Benavidez.      This study was performed with techniques to keep radiation doses as low as reasonably achievable (ALARA). Individualized dose reduction techniques using automated exposure control or adjustment of mA and/or kV according to the patient size were employed.        This report was signed and finalized on 7/28/2023 11:32 AM by ANA Benavidez.          MDM  Patient was evaluated in the ER for nausea, vomiting, diarrhea, diaphoresis since 7 AM this morning, about 5 hours ago.  He is tachycardic and hypertensive but otherwise afebrile and hemodynamically stable, alert and awake and answering questions appropriately.  Differential diagnosis includes but is not limited to viral gastroenteritis, dehydration, electrolyte abnormalities, ACS, cardiac arrhythmia, among others.  Initial plan includes CBC, CMP, troponin, urinalysis, COVID/flu swab, lipase, EKG, treatment with IV fluids and Zofran.    Labs remarkable for elevated lipase of 384, leukocytosis with white blood cell count of 17.69, mild kidney dysfunction with creatinine of 1.58 and BUN of 29.  Glucose 314 and mildly  elevated anion gap likely secondary to dehydration from nausea/vomiting/diarrhea.  CT abdomen and pelvis with contrast ordered for further evaluation of elevated lipase.  Patient denying any significant abdominal pain.  Urinalysis, COVID/flu swab, and CT abdomen pelvis pending.    Urinalysis is not indicative of infectious process.  CT as reported above by radiology reveals findings concerning for colitis, less likely inflammatory/ischemic colitis.  Patient is not having any abdominal pain and clinically presentation does not appear consistent with mesenteric ischemia.  Presentation is most consistent with viral gastroenteritis.  Discussed with Dr. Rapp, ED attending.  Patient is agreeable with plan for discharge.  He feels much better after IV fluids and Zofran.  Tachycardia is much improved after fluids.  Patient was advised to follow-up with his PCP and information was provided for Dr. Adkins, gastroenterologist, for further outpatient evaluation if symptoms persist.  He was given strict return precautions for any new or worsening symptoms or acute concerns.    Final diagnoses:   Colitis          Lucita Clifton, DIONNE  07/28/23 3551

## 2023-07-28 NOTE — DISCHARGE INSTRUCTIONS
Take Zofran as prescribed as needed for nausea and vomiting.  Drink Pedialyte or Gatorade/Powerade to stay hydrated.  Follow-up with your PCP and Dr. Adkins, gastroenterologist, for further outpatient evaluation if symptoms persist.  Return to the ER for any new or worsening symptoms or acute concerns.

## 2024-07-16 ENCOUNTER — LAB (OUTPATIENT)
Dept: LAB | Facility: HOSPITAL | Age: 59
End: 2024-07-16
Payer: COMMERCIAL

## 2024-07-16 ENCOUNTER — TRANSCRIBE ORDERS (OUTPATIENT)
Dept: LAB | Facility: HOSPITAL | Age: 59
End: 2024-07-16
Payer: COMMERCIAL

## 2024-07-16 DIAGNOSIS — E11.65 INADEQUATELY CONTROLLED DIABETES MELLITUS: ICD-10-CM

## 2024-07-16 DIAGNOSIS — E78.00 PURE HYPERCHOLESTEROLEMIA: ICD-10-CM

## 2024-07-16 DIAGNOSIS — E11.65 INADEQUATELY CONTROLLED DIABETES MELLITUS: Primary | ICD-10-CM

## 2024-07-16 LAB
ALBUMIN SERPL-MCNC: 4.1 G/DL (ref 3.5–5.2)
ALBUMIN/GLOB SERPL: 1.4 G/DL
ALP SERPL-CCNC: 95 U/L (ref 39–117)
ALT SERPL W P-5'-P-CCNC: 26 U/L (ref 1–41)
ANION GAP SERPL CALCULATED.3IONS-SCNC: 11.3 MMOL/L (ref 5–15)
AST SERPL-CCNC: 22 U/L (ref 1–40)
BASOPHILS # BLD AUTO: 0.05 10*3/MM3 (ref 0–0.2)
BASOPHILS NFR BLD AUTO: 0.6 % (ref 0–1.5)
BILIRUB SERPL-MCNC: 0.4 MG/DL (ref 0–1.2)
BUN SERPL-MCNC: 25 MG/DL (ref 6–20)
BUN/CREAT SERPL: 13.7 (ref 7–25)
CALCIUM SPEC-SCNC: 9.5 MG/DL (ref 8.6–10.5)
CHLORIDE SERPL-SCNC: 103 MMOL/L (ref 98–107)
CHOLEST SERPL-MCNC: 167 MG/DL (ref 0–200)
CO2 SERPL-SCNC: 22.7 MMOL/L (ref 22–29)
CREAT SERPL-MCNC: 1.82 MG/DL (ref 0.76–1.27)
DEPRECATED RDW RBC AUTO: 41.2 FL (ref 37–54)
EGFRCR SERPLBLD CKD-EPI 2021: 42.5 ML/MIN/1.73
EOSINOPHIL # BLD AUTO: 0.49 10*3/MM3 (ref 0–0.4)
EOSINOPHIL NFR BLD AUTO: 5.5 % (ref 0.3–6.2)
ERYTHROCYTE [DISTWIDTH] IN BLOOD BY AUTOMATED COUNT: 13 % (ref 12.3–15.4)
GLOBULIN UR ELPH-MCNC: 3 GM/DL
GLUCOSE SERPL-MCNC: 249 MG/DL (ref 65–99)
HBA1C MFR BLD: 8.7 % (ref 4.8–5.6)
HCT VFR BLD AUTO: 38.4 % (ref 37.5–51)
HDLC SERPL-MCNC: 34 MG/DL (ref 40–60)
HGB BLD-MCNC: 13.1 G/DL (ref 13–17.7)
IMM GRANULOCYTES # BLD AUTO: 0.03 10*3/MM3 (ref 0–0.05)
IMM GRANULOCYTES NFR BLD AUTO: 0.3 % (ref 0–0.5)
LDLC SERPL CALC-MCNC: 101 MG/DL (ref 0–100)
LDLC/HDLC SERPL: 2.82 {RATIO}
LYMPHOCYTES # BLD AUTO: 1.97 10*3/MM3 (ref 0.7–3.1)
LYMPHOCYTES NFR BLD AUTO: 22 % (ref 19.6–45.3)
MAGNESIUM SERPL-MCNC: 1.7 MG/DL (ref 1.6–2.6)
MCH RBC QN AUTO: 30.1 PG (ref 26.6–33)
MCHC RBC AUTO-ENTMCNC: 34.1 G/DL (ref 31.5–35.7)
MCV RBC AUTO: 88.3 FL (ref 79–97)
MONOCYTES # BLD AUTO: 0.52 10*3/MM3 (ref 0.1–0.9)
MONOCYTES NFR BLD AUTO: 5.8 % (ref 5–12)
NEUTROPHILS NFR BLD AUTO: 5.91 10*3/MM3 (ref 1.7–7)
NEUTROPHILS NFR BLD AUTO: 65.8 % (ref 42.7–76)
NRBC BLD AUTO-RTO: 0 /100 WBC (ref 0–0.2)
PLATELET # BLD AUTO: 215 10*3/MM3 (ref 140–450)
PMV BLD AUTO: 9.9 FL (ref 6–12)
POTASSIUM SERPL-SCNC: 5.7 MMOL/L (ref 3.5–5.2)
PROT SERPL-MCNC: 7.1 G/DL (ref 6–8.5)
RBC # BLD AUTO: 4.35 10*6/MM3 (ref 4.14–5.8)
SODIUM SERPL-SCNC: 137 MMOL/L (ref 136–145)
TRIGL SERPL-MCNC: 186 MG/DL (ref 0–150)
VLDLC SERPL-MCNC: 32 MG/DL (ref 5–40)
WBC NRBC COR # BLD AUTO: 8.97 10*3/MM3 (ref 3.4–10.8)

## 2024-07-16 PROCEDURE — 83036 HEMOGLOBIN GLYCOSYLATED A1C: CPT

## 2024-07-16 PROCEDURE — 80061 LIPID PANEL: CPT

## 2024-07-16 PROCEDURE — 85025 COMPLETE CBC W/AUTO DIFF WBC: CPT

## 2024-07-16 PROCEDURE — 36415 COLL VENOUS BLD VENIPUNCTURE: CPT

## 2024-07-16 PROCEDURE — 83735 ASSAY OF MAGNESIUM: CPT

## 2024-07-16 PROCEDURE — 80053 COMPREHEN METABOLIC PANEL: CPT

## 2024-07-24 ENCOUNTER — TRANSCRIBE ORDERS (OUTPATIENT)
Dept: ADMINISTRATIVE | Facility: HOSPITAL | Age: 59
End: 2024-07-24
Payer: COMMERCIAL

## 2024-07-25 ENCOUNTER — TRANSCRIBE ORDERS (OUTPATIENT)
Dept: LAB | Facility: HOSPITAL | Age: 59
End: 2024-07-25
Payer: COMMERCIAL

## 2024-07-25 ENCOUNTER — LAB (OUTPATIENT)
Dept: LAB | Facility: HOSPITAL | Age: 59
End: 2024-07-25
Payer: COMMERCIAL

## 2024-07-25 DIAGNOSIS — E78.5 HYPERLIPIDEMIA, UNSPECIFIED HYPERLIPIDEMIA TYPE: ICD-10-CM

## 2024-07-25 DIAGNOSIS — E78.5 HYPERLIPIDEMIA, UNSPECIFIED HYPERLIPIDEMIA TYPE: Primary | ICD-10-CM

## 2024-07-25 LAB
ALBUMIN SERPL-MCNC: 4.3 G/DL (ref 3.5–5.2)
ALBUMIN/GLOB SERPL: 1.6 G/DL
ALP SERPL-CCNC: 97 U/L (ref 39–117)
ALT SERPL W P-5'-P-CCNC: 28 U/L (ref 1–41)
ANION GAP SERPL CALCULATED.3IONS-SCNC: 11.8 MMOL/L (ref 5–15)
AST SERPL-CCNC: 25 U/L (ref 1–40)
BILIRUB SERPL-MCNC: 0.5 MG/DL (ref 0–1.2)
BUN SERPL-MCNC: 24 MG/DL (ref 6–20)
BUN/CREAT SERPL: 14.3 (ref 7–25)
CALCIUM SPEC-SCNC: 9.7 MG/DL (ref 8.6–10.5)
CHLORIDE SERPL-SCNC: 107 MMOL/L (ref 98–107)
CO2 SERPL-SCNC: 23.2 MMOL/L (ref 22–29)
CREAT SERPL-MCNC: 1.68 MG/DL (ref 0.76–1.27)
EGFRCR SERPLBLD CKD-EPI 2021: 46.8 ML/MIN/1.73
GLOBULIN UR ELPH-MCNC: 2.7 GM/DL
GLUCOSE SERPL-MCNC: 138 MG/DL (ref 65–99)
POTASSIUM SERPL-SCNC: 5.1 MMOL/L (ref 3.5–5.2)
PROT SERPL-MCNC: 7 G/DL (ref 6–8.5)
SODIUM SERPL-SCNC: 142 MMOL/L (ref 136–145)

## 2024-07-25 PROCEDURE — 80053 COMPREHEN METABOLIC PANEL: CPT

## 2024-07-25 PROCEDURE — 36415 COLL VENOUS BLD VENIPUNCTURE: CPT

## 2025-03-19 ENCOUNTER — TELEPHONE (OUTPATIENT)
Dept: SURGERY | Facility: CLINIC | Age: 60
End: 2025-03-19
Payer: COMMERCIAL

## 2025-03-19 NOTE — TELEPHONE ENCOUNTER
No we have not received a referral for this pt. You can let the pt know he needs to contact [facility] to see the status of the referral that is supposed to be sent to us.

## 2025-03-19 NOTE — TELEPHONE ENCOUNTER
PATIENT CALLED ABOUT A REFERRAL FROM [facility] HE WOULD LIKE TO KNOW IF WE GOT IT AND HOW SOON HE CAN SCHEDULE FOR SURGERY. HE WOULD LIKE A CALL BACK ABOUT THIS

## 2025-03-20 NOTE — TELEPHONE ENCOUNTER
PATIENT CALLED AGAIN TO CHECK ON THE REFERRAL, ADVISED HIM WE STILL DO NOT HAVE IT. I DID CALL PCP OFFICE AND LEFT VM WITH OFFICE # TO CALL US TO CHECK WHAT FAX NUMBER THEY HAVE FOR US.

## 2025-03-21 ENCOUNTER — TELEPHONE (OUTPATIENT)
Dept: SURGERY | Facility: CLINIC | Age: 60
End: 2025-03-21
Payer: COMMERCIAL

## 2025-03-21 NOTE — H&P (VIEW-ONLY)
Patient: Edy Pinto    YOB: 1965    Date: 03/24/2025    Primary Care Provider: Juan R Castro PA    Chief Complaint   Patient presents with    Cholelithiasis       SUBJECTIVE:    History of present illness: Patient with a 2-month history of abdominal pain.  States that it radiates across the epigastrium into the right upper quadrant.  Back pain as well.  Associated with nausea and vomiting.  Eating can make it worse but he may have pain without eating.    The following portions of the patient's history were reviewed and updated as appropriate: allergies, current medications, past family history, past medical history, past social history, past surgical history and problem list.      Review of Systems   Constitutional:  Negative for chills, fever and unexpected weight change.   HENT:  Negative for trouble swallowing and voice change.    Eyes:  Negative for visual disturbance.   Respiratory:  Negative for apnea, cough, chest tightness, shortness of breath and wheezing.    Cardiovascular:  Negative for chest pain, palpitations and leg swelling.   Gastrointestinal:  Positive for abdominal pain, diarrhea, nausea and vomiting. Negative for abdominal distention, anal bleeding, blood in stool, constipation and rectal pain.   Endocrine: Negative for cold intolerance and heat intolerance.   Genitourinary:  Negative for difficulty urinating, dysuria, flank pain, scrotal swelling and testicular pain.   Musculoskeletal:  Negative for back pain, gait problem and joint swelling.   Skin:  Negative for color change, rash and wound.   Neurological:  Negative for dizziness, syncope, speech difficulty, weakness, numbness and headaches.   Hematological:  Negative for adenopathy. Does not bruise/bleed easily.   Psychiatric/Behavioral:  Negative for confusion. The patient is not nervous/anxious.          Allergies:  No Known Allergies    Medications:    Current Outpatient Medications:     cyclobenzaprine (FLEXERIL) 10 MG  tablet, Take 1 tablet by mouth 3 (Three) Times a Day As Needed for Muscle Spasms., Disp: , Rfl:     glimepiride (AMARYL) 4 MG tablet, Take 1 tablet by mouth Every 12 (Twelve) Hours., Disp: , Rfl:     latanoprost (XALATAN) 0.005 % ophthalmic solution, Administer 1 drop to both eyes Daily., Disp: , Rfl:     loratadine (CLARITIN) 10 MG tablet, Take 1 tablet by mouth Daily., Disp: , Rfl:     meloxicam (MOBIC) 15 MG tablet, Take 1 tablet by mouth Daily., Disp: , Rfl:     metFORMIN (GLUCOPHAGE) 1000 MG tablet, Take 1 tablet by mouth 2 (Two) Times a Day With Meals., Disp: , Rfl:     ondansetron ODT (ZOFRAN-ODT) 4 MG disintegrating tablet, Place 1 tablet on the tongue Every 8 (Eight) Hours As Needed for Nausea or Vomiting., Disp: 12 tablet, Rfl: 0    pantoprazole (PROTONIX) 40 MG EC tablet, Take 1 tablet by mouth Daily., Disp: , Rfl:     History:  Past Medical History:   Diagnosis Date    Arthritis     Asthma     AS A CHILD    Cataract     Cholelithiasis 01/10/25    Severe pain    Diabetes mellitus     TAKES ORAL MEDS    Wears glasses        Past Surgical History:   Procedure Laterality Date    CATARACT EXTRACTION      SCHEDULED FOR RIGHT IOL FOR 17    CATARACT EXTRACTION W/ INTRAOCULAR LENS IMPLANT Right 2017    Procedure: RIGHT CATARACT PHACO EXTRACTION WITH INTRAOCULAR LENS IMPLANT;  Surgeon: Karlene Castellanos MD;  Location: [facility];  Service:     CATARACT EXTRACTION W/ INTRAOCULAR LENS IMPLANT Left 3/28/2017    Procedure: LEFT CATARACT PHACO EXTRACTION WITH INTRAOCULAR LENS IMPLANT;  Surgeon: Karlene Castellanos MD;  Location: [facility];  Service:     KNEE ARTHROSCOPY Right        History reviewed. No pertinent family history.    Social History     Tobacco Use    Smoking status: Former     Current packs/day: 0.00     Average packs/day: 1 pack/day for 31.0 years (31.0 ttl pk-yrs)     Types: Cigarettes     Quit date: 3/20/2007     Years since quittin.0    Smokeless tobacco: Never    Tobacco  "comments:     REPORTS HE QUIT SMOKING 11 YEARS AGO   Vaping Use    Vaping status: Never Used   Substance Use Topics    Alcohol use: No    Drug use: No        OBJECTIVE:    Vital Signs:   Vitals:    03/24/25 1420   BP: (!) 186/93   Pulse: 87   SpO2: 100%   Weight: 104 kg (230 lb)   Height: 170.2 cm (67\")       Physical Exam:       General Appearance:    Alert, cooperative, in no acute distress   Head:    Normocephalic, without obvious abnormality, atraumatic   Eyes:            Normal.  No scleral icterus.  PERRLA    Lungs:     Clear to auscultation,respirations regular, even and                  unlabored    Heart:    Regular rhythm and normal rate,   Abdomen:   Soft and nontender nondistended.   Extremities:   Moves all extremities well, no edema, no cyanosis, no             redness   Skin:   No bleeding, bruising or rash   Neurologic:   Normal without gross deficits.   Psychiatric: No evidence of depression or anxiety          Results Review:   I reviewed the patient's new clinical results.  CBC and CMP were reviewed.  Ultrasound was reviewed.    Review of Systems was reviewed and confirmed as accurate as documented by the MA.    ASSESSMENT/PLAN:    1. Cholelithiasis without cholecystitis        Patient with symptomatic cholelithiasis.  At this point patient is not interested in further dietary management and is having symptoms even when he is not eating.  I offered the patient a laparoscopic cholecystectomy.  I explained this procedure to them in detail and they understand the procedure.  They also understand the risks of bleeding, infection, bile leak, ductal injury, bowel injury, the possibility of converting to an open procedure etc. They understand all of these risks and I have answered all of their questions and they wish to proceed.          Electronically signed by Ruiz Hermosillo MD  03/24/25          "

## 2025-03-21 NOTE — PROGRESS NOTES
Patient: Edy Pinto    YOB: 1965    Date: 03/24/2025    Primary Care Provider: Juan R Castro PA    Chief Complaint   Patient presents with    Cholelithiasis       SUBJECTIVE:    History of present illness: Patient with a 2-month history of abdominal pain.  States that it radiates across the epigastrium into the right upper quadrant.  Back pain as well.  Associated with nausea and vomiting.  Eating can make it worse but he may have pain without eating.    The following portions of the patient's history were reviewed and updated as appropriate: allergies, current medications, past family history, past medical history, past social history, past surgical history and problem list.      Review of Systems   Constitutional:  Negative for chills, fever and unexpected weight change.   HENT:  Negative for trouble swallowing and voice change.    Eyes:  Negative for visual disturbance.   Respiratory:  Negative for apnea, cough, chest tightness, shortness of breath and wheezing.    Cardiovascular:  Negative for chest pain, palpitations and leg swelling.   Gastrointestinal:  Positive for abdominal pain, diarrhea, nausea and vomiting. Negative for abdominal distention, anal bleeding, blood in stool, constipation and rectal pain.   Endocrine: Negative for cold intolerance and heat intolerance.   Genitourinary:  Negative for difficulty urinating, dysuria, flank pain, scrotal swelling and testicular pain.   Musculoskeletal:  Negative for back pain, gait problem and joint swelling.   Skin:  Negative for color change, rash and wound.   Neurological:  Negative for dizziness, syncope, speech difficulty, weakness, numbness and headaches.   Hematological:  Negative for adenopathy. Does not bruise/bleed easily.   Psychiatric/Behavioral:  Negative for confusion. The patient is not nervous/anxious.          Allergies:  No Known Allergies    Medications:    Current Outpatient Medications:     cyclobenzaprine (FLEXERIL) 10 MG  tablet, Take 1 tablet by mouth 3 (Three) Times a Day As Needed for Muscle Spasms., Disp: , Rfl:     glimepiride (AMARYL) 4 MG tablet, Take 1 tablet by mouth Every 12 (Twelve) Hours., Disp: , Rfl:     latanoprost (XALATAN) 0.005 % ophthalmic solution, Administer 1 drop to both eyes Daily., Disp: , Rfl:     loratadine (CLARITIN) 10 MG tablet, Take 1 tablet by mouth Daily., Disp: , Rfl:     meloxicam (MOBIC) 15 MG tablet, Take 1 tablet by mouth Daily., Disp: , Rfl:     metFORMIN (GLUCOPHAGE) 1000 MG tablet, Take 1 tablet by mouth 2 (Two) Times a Day With Meals., Disp: , Rfl:     ondansetron ODT (ZOFRAN-ODT) 4 MG disintegrating tablet, Place 1 tablet on the tongue Every 8 (Eight) Hours As Needed for Nausea or Vomiting., Disp: 12 tablet, Rfl: 0    pantoprazole (PROTONIX) 40 MG EC tablet, Take 1 tablet by mouth Daily., Disp: , Rfl:     History:  Past Medical History:   Diagnosis Date    Arthritis     Asthma     AS A CHILD    Cataract     Cholelithiasis 01/10/25    Severe pain    Diabetes mellitus     TAKES ORAL MEDS    Wears glasses        Past Surgical History:   Procedure Laterality Date    CATARACT EXTRACTION      SCHEDULED FOR RIGHT IOL FOR 17    CATARACT EXTRACTION W/ INTRAOCULAR LENS IMPLANT Right 2017    Procedure: RIGHT CATARACT PHACO EXTRACTION WITH INTRAOCULAR LENS IMPLANT;  Surgeon: Karlene Castellanos MD;  Location: [facility];  Service:     CATARACT EXTRACTION W/ INTRAOCULAR LENS IMPLANT Left 3/28/2017    Procedure: LEFT CATARACT PHACO EXTRACTION WITH INTRAOCULAR LENS IMPLANT;  Surgeon: Karlene Castellanos MD;  Location: [facility];  Service:     KNEE ARTHROSCOPY Right        History reviewed. No pertinent family history.    Social History     Tobacco Use    Smoking status: Former     Current packs/day: 0.00     Average packs/day: 1 pack/day for 31.0 years (31.0 ttl pk-yrs)     Types: Cigarettes     Quit date: 3/20/2007     Years since quittin.0    Smokeless tobacco: Never    Tobacco  "comments:     REPORTS HE QUIT SMOKING 11 YEARS AGO   Vaping Use    Vaping status: Never Used   Substance Use Topics    Alcohol use: No    Drug use: No        OBJECTIVE:    Vital Signs:   Vitals:    03/24/25 1420   BP: (!) 186/93   Pulse: 87   SpO2: 100%   Weight: 104 kg (230 lb)   Height: 170.2 cm (67\")       Physical Exam:       General Appearance:    Alert, cooperative, in no acute distress   Head:    Normocephalic, without obvious abnormality, atraumatic   Eyes:            Normal.  No scleral icterus.  PERRLA    Lungs:     Clear to auscultation,respirations regular, even and                  unlabored    Heart:    Regular rhythm and normal rate,   Abdomen:   Soft and nontender nondistended.   Extremities:   Moves all extremities well, no edema, no cyanosis, no             redness   Skin:   No bleeding, bruising or rash   Neurologic:   Normal without gross deficits.   Psychiatric: No evidence of depression or anxiety          Results Review:   I reviewed the patient's new clinical results.  CBC and CMP were reviewed.  Ultrasound was reviewed.    Review of Systems was reviewed and confirmed as accurate as documented by the MA.    ASSESSMENT/PLAN:    1. Cholelithiasis without cholecystitis        Patient with symptomatic cholelithiasis.  At this point patient is not interested in further dietary management and is having symptoms even when he is not eating.  I offered the patient a laparoscopic cholecystectomy.  I explained this procedure to them in detail and they understand the procedure.  They also understand the risks of bleeding, infection, bile leak, ductal injury, bowel injury, the possibility of converting to an open procedure etc. They understand all of these risks and I have answered all of their questions and they wish to proceed.          Electronically signed by Ruiz Hermosillo MD  03/24/25          "

## 2025-03-24 ENCOUNTER — OFFICE VISIT (OUTPATIENT)
Dept: SURGERY | Facility: CLINIC | Age: 60
End: 2025-03-24
Payer: COMMERCIAL

## 2025-03-24 VITALS
HEIGHT: 67 IN | DIASTOLIC BLOOD PRESSURE: 93 MMHG | SYSTOLIC BLOOD PRESSURE: 186 MMHG | BODY MASS INDEX: 36.1 KG/M2 | HEART RATE: 87 BPM | WEIGHT: 230 LBS | OXYGEN SATURATION: 100 %

## 2025-03-24 DIAGNOSIS — K80.20 CHOLELITHIASIS WITHOUT CHOLECYSTITIS: Primary | ICD-10-CM

## 2025-03-24 PROCEDURE — 99204 OFFICE O/P NEW MOD 45 MIN: CPT | Performed by: SURGERY

## 2025-03-24 RX ORDER — LATANOPROST 50 UG/ML
1 SOLUTION/ DROPS OPHTHALMIC DAILY
COMMUNITY
Start: 2025-03-13

## 2025-03-24 RX ORDER — LORATADINE 10 MG/1
10 TABLET ORAL DAILY
COMMUNITY
Start: 2025-03-13

## 2025-03-24 RX ORDER — GLIMEPIRIDE 4 MG/1
1 TABLET ORAL EVERY 12 HOURS SCHEDULED
COMMUNITY
Start: 2025-03-20

## 2025-03-24 RX ORDER — PANTOPRAZOLE SODIUM 40 MG/1
1 TABLET, DELAYED RELEASE ORAL DAILY
COMMUNITY
Start: 2025-03-13

## 2025-03-25 ENCOUNTER — APPOINTMENT (OUTPATIENT)
Dept: GENERAL RADIOLOGY | Facility: HOSPITAL | Age: 60
End: 2025-03-25
Payer: COMMERCIAL

## 2025-03-25 ENCOUNTER — ANESTHESIA EVENT (OUTPATIENT)
Dept: PERIOP | Facility: HOSPITAL | Age: 60
End: 2025-03-25
Payer: COMMERCIAL

## 2025-03-25 ENCOUNTER — ANESTHESIA (OUTPATIENT)
Dept: PERIOP | Facility: HOSPITAL | Age: 60
End: 2025-03-25
Payer: COMMERCIAL

## 2025-03-25 ENCOUNTER — HOSPITAL ENCOUNTER (OUTPATIENT)
Facility: HOSPITAL | Age: 60
Setting detail: HOSPITAL OUTPATIENT SURGERY
Discharge: HOME | End: 2025-03-25
Attending: SURGERY | Admitting: SURGERY
Payer: COMMERCIAL

## 2025-03-25 ENCOUNTER — APPOINTMENT (OUTPATIENT)
Dept: PREADMISSION TESTING | Facility: HOSPITAL | Age: 60
End: 2025-03-25
Payer: COMMERCIAL

## 2025-03-25 VITALS
DIASTOLIC BLOOD PRESSURE: 78 MMHG | TEMPERATURE: 97.7 F | RESPIRATION RATE: 20 BRPM | SYSTOLIC BLOOD PRESSURE: 137 MMHG | OXYGEN SATURATION: 95 % | HEART RATE: 85 BPM

## 2025-03-25 DIAGNOSIS — K80.20 CHOLELITHIASIS WITHOUT CHOLECYSTITIS: ICD-10-CM

## 2025-03-25 LAB — GLUCOSE BLDC GLUCOMTR-MCNC: 167 MG/DL (ref 70–130)

## 2025-03-25 PROCEDURE — C1894 INTRO/SHEATH, NON-LASER: HCPCS | Performed by: SURGERY

## 2025-03-25 PROCEDURE — 25010000002 KETOROLAC TROMETHAMINE PER 15 MG: Performed by: NURSE ANESTHETIST, CERTIFIED REGISTERED

## 2025-03-25 PROCEDURE — 25010000002 DEXAMETHASONE PER 1 MG: Performed by: NURSE ANESTHETIST, CERTIFIED REGISTERED

## 2025-03-25 PROCEDURE — 47563 LAPARO CHOLECYSTECTOMY/GRAPH: CPT | Performed by: SURGERY

## 2025-03-25 PROCEDURE — 25010000002 FENTANYL CITRATE (PF) 50 MCG/ML SOLUTION PREFILLED SYRINGE: Performed by: NURSE ANESTHETIST, CERTIFIED REGISTERED

## 2025-03-25 PROCEDURE — 25010000002 SUGAMMADEX 500 MG/5ML SOLUTION: Performed by: NURSE ANESTHETIST, CERTIFIED REGISTERED

## 2025-03-25 PROCEDURE — 74300 X-RAY BILE DUCTS/PANCREAS: CPT

## 2025-03-25 PROCEDURE — 25010000002 HYDROMORPHONE 1 MG/ML SOLUTION: Performed by: NURSE ANESTHETIST, CERTIFIED REGISTERED

## 2025-03-25 PROCEDURE — 25010000002 ONDANSETRON PER 1 MG: Performed by: NURSE ANESTHETIST, CERTIFIED REGISTERED

## 2025-03-25 PROCEDURE — 25510000001 IOPAMIDOL 61 % SOLUTION: Performed by: SURGERY

## 2025-03-25 PROCEDURE — 25010000002 HYDROMORPHONE PER 4 MG: Performed by: NURSE ANESTHETIST, CERTIFIED REGISTERED

## 2025-03-25 PROCEDURE — 82948 REAGENT STRIP/BLOOD GLUCOSE: CPT

## 2025-03-25 PROCEDURE — 25010000002 LIDOCAINE (CARDIAC): Performed by: NURSE ANESTHETIST, CERTIFIED REGISTERED

## 2025-03-25 PROCEDURE — 25010000002 CEFAZOLIN PER 500 MG: Performed by: SURGERY

## 2025-03-25 PROCEDURE — 25810000003 LACTATED RINGERS PER 1000 ML: Performed by: SURGERY

## 2025-03-25 PROCEDURE — 25010000002 PROPOFOL 10 MG/ML EMULSION: Performed by: NURSE ANESTHETIST, CERTIFIED REGISTERED

## 2025-03-25 DEVICE — LIGAMAX 5 MM ENDOSCOPIC MULTIPLE CLIP APPLIER
Type: IMPLANTABLE DEVICE | Site: ABDOMEN | Status: FUNCTIONAL
Brand: LIGAMAX

## 2025-03-25 RX ORDER — PROMETHAZINE HYDROCHLORIDE 12.5 MG/1
12.5 TABLET ORAL ONCE AS NEEDED
Status: DISCONTINUED | OUTPATIENT
Start: 2025-03-25 | End: 2025-03-25 | Stop reason: HOSPADM

## 2025-03-25 RX ORDER — FENTANYL CITRATE 50 UG/ML
INJECTION, SOLUTION INTRAMUSCULAR; INTRAVENOUS AS NEEDED
Status: DISCONTINUED | OUTPATIENT
Start: 2025-03-25 | End: 2025-03-25 | Stop reason: SURG

## 2025-03-25 RX ORDER — IBUPROFEN 800 MG/1
800 TABLET, FILM COATED ORAL EVERY 6 HOURS PRN
Qty: 16 TABLET | Refills: 0 | Status: SHIPPED | OUTPATIENT
Start: 2025-03-25 | End: 2025-03-30

## 2025-03-25 RX ORDER — SODIUM CHLORIDE, SODIUM LACTATE, POTASSIUM CHLORIDE, CALCIUM CHLORIDE 600; 310; 30; 20 MG/100ML; MG/100ML; MG/100ML; MG/100ML
1000 INJECTION, SOLUTION INTRAVENOUS CONTINUOUS
Status: DISCONTINUED | OUTPATIENT
Start: 2025-03-25 | End: 2025-03-25 | Stop reason: HOSPADM

## 2025-03-25 RX ORDER — SODIUM CHLORIDE 0.9 % (FLUSH) 0.9 %
10 SYRINGE (ML) INJECTION AS NEEDED
Status: DISCONTINUED | OUTPATIENT
Start: 2025-03-25 | End: 2025-03-25 | Stop reason: HOSPADM

## 2025-03-25 RX ORDER — ONDANSETRON 2 MG/ML
INJECTION INTRAMUSCULAR; INTRAVENOUS AS NEEDED
Status: DISCONTINUED | OUTPATIENT
Start: 2025-03-25 | End: 2025-03-25 | Stop reason: SURG

## 2025-03-25 RX ORDER — DEXAMETHASONE SODIUM PHOSPHATE 4 MG/ML
INJECTION, SOLUTION INTRA-ARTICULAR; INTRALESIONAL; INTRAMUSCULAR; INTRAVENOUS; SOFT TISSUE AS NEEDED
Status: DISCONTINUED | OUTPATIENT
Start: 2025-03-25 | End: 2025-03-25 | Stop reason: SURG

## 2025-03-25 RX ORDER — HYDROMORPHONE HYDROCHLORIDE 2 MG/ML
INJECTION, SOLUTION INTRAMUSCULAR; INTRAVENOUS; SUBCUTANEOUS AS NEEDED
Status: DISCONTINUED | OUTPATIENT
Start: 2025-03-25 | End: 2025-03-25 | Stop reason: SURG

## 2025-03-25 RX ORDER — HYDROCODONE BITARTRATE AND ACETAMINOPHEN 5; 325 MG/1; MG/1
1-2 TABLET ORAL EVERY 4 HOURS PRN
Qty: 10 TABLET | Refills: 0 | Status: SHIPPED | OUTPATIENT
Start: 2025-03-25 | End: 2025-03-27

## 2025-03-25 RX ORDER — ONDANSETRON 2 MG/ML
4 INJECTION INTRAMUSCULAR; INTRAVENOUS ONCE AS NEEDED
Status: DISCONTINUED | OUTPATIENT
Start: 2025-03-25 | End: 2025-03-25 | Stop reason: HOSPADM

## 2025-03-25 RX ORDER — KETAMINE HCL IN NACL, ISO-OSM 100MG/10ML
SYRINGE (ML) INJECTION AS NEEDED
Status: DISCONTINUED | OUTPATIENT
Start: 2025-03-25 | End: 2025-03-25 | Stop reason: SURG

## 2025-03-25 RX ORDER — KETOROLAC TROMETHAMINE 30 MG/ML
INJECTION, SOLUTION INTRAMUSCULAR; INTRAVENOUS AS NEEDED
Status: DISCONTINUED | OUTPATIENT
Start: 2025-03-25 | End: 2025-03-25 | Stop reason: SURG

## 2025-03-25 RX ORDER — ONDANSETRON 4 MG/1
4 TABLET, FILM COATED ORAL EVERY 8 HOURS PRN
Qty: 8 TABLET | Refills: 0 | Status: SHIPPED | OUTPATIENT
Start: 2025-03-25 | End: 2025-03-30

## 2025-03-25 RX ORDER — SUCCINYLCHOLINE/SOD CL,ISO/PF 200MG/10ML
SYRINGE (ML) INTRAVENOUS AS NEEDED
Status: DISCONTINUED | OUTPATIENT
Start: 2025-03-25 | End: 2025-03-25 | Stop reason: SURG

## 2025-03-25 RX ORDER — ROCURONIUM BROMIDE 50 MG/5 ML
SYRINGE (ML) INTRAVENOUS AS NEEDED
Status: DISCONTINUED | OUTPATIENT
Start: 2025-03-25 | End: 2025-03-25 | Stop reason: SURG

## 2025-03-25 RX ORDER — IOPAMIDOL 612 MG/ML
INJECTION, SOLUTION INTRAVASCULAR AS NEEDED
Status: DISCONTINUED | OUTPATIENT
Start: 2025-03-25 | End: 2025-03-25 | Stop reason: HOSPADM

## 2025-03-25 RX ORDER — BUPIVACAINE HYDROCHLORIDE AND EPINEPHRINE 2.5; 5 MG/ML; UG/ML
INJECTION, SOLUTION EPIDURAL; INFILTRATION; INTRACAUDAL; PERINEURAL AS NEEDED
Status: DISCONTINUED | OUTPATIENT
Start: 2025-03-25 | End: 2025-03-25 | Stop reason: HOSPADM

## 2025-03-25 RX ORDER — PROPOFOL 10 MG/ML
VIAL (ML) INTRAVENOUS AS NEEDED
Status: DISCONTINUED | OUTPATIENT
Start: 2025-03-25 | End: 2025-03-25 | Stop reason: SURG

## 2025-03-25 RX ADMIN — SUGAMMADEX 210 MG: 100 INJECTION, SOLUTION INTRAVENOUS at 13:53

## 2025-03-25 RX ADMIN — SODIUM CHLORIDE 2000 MG: 9 INJECTION, SOLUTION INTRAVENOUS at 12:55

## 2025-03-25 RX ADMIN — FENTANYL CITRATE 100 MCG: 50 INJECTION, SOLUTION INTRAMUSCULAR; INTRAVENOUS at 12:49

## 2025-03-25 RX ADMIN — KETOROLAC TROMETHAMINE 30 MG: 30 INJECTION, SOLUTION INTRAMUSCULAR; INTRAVENOUS at 13:33

## 2025-03-25 RX ADMIN — HYDROMORPHONE HYDROCHLORIDE 0.5 MG: 1 INJECTION, SOLUTION INTRAMUSCULAR; INTRAVENOUS; SUBCUTANEOUS at 14:53

## 2025-03-25 RX ADMIN — Medication 120 MG: at 12:49

## 2025-03-25 RX ADMIN — SODIUM CHLORIDE, POTASSIUM CHLORIDE, SODIUM LACTATE AND CALCIUM CHLORIDE 1000 ML: 600; 310; 30; 20 INJECTION, SOLUTION INTRAVENOUS at 12:22

## 2025-03-25 RX ADMIN — ONDANSETRON 4 MG: 2 INJECTION INTRAMUSCULAR; INTRAVENOUS at 13:51

## 2025-03-25 RX ADMIN — DEXAMETHASONE SODIUM PHOSPHATE 4 MG: 4 INJECTION, SOLUTION INTRA-ARTICULAR; INTRALESIONAL; INTRAMUSCULAR; INTRAVENOUS; SOFT TISSUE at 12:55

## 2025-03-25 RX ADMIN — Medication 20 MG: at 12:55

## 2025-03-25 RX ADMIN — LIDOCAINE HYDROCHLORIDE 100 MG: 20 INJECTION, SOLUTION INTRAVENOUS at 12:49

## 2025-03-25 RX ADMIN — Medication 35 MG: at 12:59

## 2025-03-25 RX ADMIN — PROPOFOL 200 MG: 10 INJECTION, EMULSION INTRAVENOUS at 12:49

## 2025-03-25 RX ADMIN — HYDROMORPHONE HYDROCHLORIDE 0.5 MG: 2 INJECTION INTRAMUSCULAR; INTRAVENOUS; SUBCUTANEOUS at 13:25

## 2025-03-25 RX ADMIN — Medication 5 MG: at 12:49

## 2025-03-25 NOTE — ANESTHESIA POSTPROCEDURE EVALUATION
Patient: Edy Pinto    Procedure Summary       Date: 03/25/25 Room / Location: [facility] OR 5 /  [facility]    Anesthesia Start: 1245 Anesthesia Stop: 1400    Procedure: Laparoscopic cholecystectomy with IOC (Abdomen) Diagnosis:       Cholelithiasis without cholecystitis      (Cholelithiasis without cholecystitis [K80.20])    Surgeons: Ruiz Hermosillo MD Provider: Gianni Barnard CRNA    Anesthesia Type: general ASA Status: 3            Anesthesia Type: general    Vitals  Vitals Value Taken Time   /87 03/25/25 15:15   Temp 97.1 °F (36.2 °C) 03/25/25 15:05   Pulse 90 03/25/25 15:16   Resp 16 03/25/25 15:05   SpO2 93 % 03/25/25 15:17   Vitals shown include unfiled device data.        Post Anesthesia Care and Evaluation    Patient location during evaluation: PHASE II  Patient participation: complete - patient participated  Level of consciousness: awake and alert  Pain score: 1  Pain management: adequate    Airway patency: patent  Anesthetic complications: No anesthetic complications  PONV Status: none  Cardiovascular status: acceptable  Respiratory status: acceptable  Hydration status: acceptable  No anesthesia care post op

## 2025-03-25 NOTE — PROGRESS NOTES
"Patient: Edy Pinto    YOB: 1965    Date: 03/31/2025    Primary Care Provider: Juan R Castro PA    Chief Complaint   Patient presents with    Post-op       History of present illness:  I saw the patient in the office today as a followup from their recent laparoscopic cholecystectomy.     They state that they have done well post-operatively and are having no complaints.    Vital Signs:   Vitals:    03/31/25 1313   BP: 176/88   Pulse: 84   SpO2: 97%   Weight: 103 kg (227 lb)   Height: 170.2 cm (67\")       Physical Exam:    Abdomen: Soft nontender nondistended.  Slight ecchymosis inferiorly to the umbilicus but no hematoma.  Wounds are healing nicely without evidence of infection     Assessment / Plan :    1. Postoperative visit      Overall doing well.  Having no issues.  Activity and dietary instructions were given.  Follow-up as needed.    Electronically signed by Ruiz Hermosillo MD  03/31/25                "

## 2025-03-25 NOTE — OP NOTE
PATIENT:     Edy Pinto    DATE OF SURGERY:     3/25/2025    PHYSICIAN:   Ruiz Hermosillo MD    REFERRING PHYSICIAN:  Juan R Castro PA     YOB: 1965    PREOPERATIVE DIAGNOSIS:  Chronic cholecystitis due to Cholelithiasis    POSTOPERATIVE DIAGNOSIS:  Chronic cholecystitis due to Cholelithiasis    PROCEDURE:  Laparoscopic cholecystectomy with intraoperative cholangiography.    ANESTHESIA:  General endotracheal.    EBL: Less than 30 mL    Specimen: Gallbladder    Complications: None    OPERATIVE PROCEDURE:  The patient was taken to the operating room, placed in the supine position, and given general endotracheal anesthesia.  The patient was prepped and draped in the normal sterile fashion, and also received preoperative IV antibiotics.  An appropriate timeout was performed by the nursing staff prior to the incision.     Local anesthetic with Marcaine 0.5% was infused locally at all incision sites. An infraumbilical incision was then made to insert a 5 mm Optiview into the peritoneal cavity and the peritoneal cavity was insufflated with carbon dioxide.  This port was later exchanged for a 10 mm port.  A separate 5 mm port was inserted in the subxiphoid region  along with a right subcostal 5 mm port.  The gallbladder was well visualized.      Good exposure was obtained and dissection was performed at the junction of the cystic duct and the gallbladder. The cystic duct and cystic artery were identified in the normal manner.  A clip was then placed on the cystic duct proximally and then two clips were placed on the cystic artery proximally and one distally.  Cystic ductotomy was performed.  A separate cholangiogram catheter had been inserted through a right upper quadrant introducer port and then this was fed into the cystic duct itself and a clip was applied.     Intraoperative cholangiography was then performed under fluoroscopy, carefully evaluating the biliary ductal anatomy.  This was done  without difficulty.  No anatomic abnormality was noted.      The cholangiogram catheter was removed.  The cystic duct was clipped twice distally and then divided, as was the cystic artery.  Bovie electrocautery with L-hook was then used to remove the gallbladder from the liver bed.  It was then removed through the umbilical port site using an Endo Catch bag. Fascia was closed with a 2-O Vicryl figure of eight suture.    Copious irrigation was used in the patient’s abdominal cavity.  It was clean and dry at this point.  Hemostasis had been well controlled.  Hemostasis was intact and there was no evidence of bilious leakage. Trocars were removed under direct vision and the skin was closed with 4-0 PDS subcuticular stitch along with Steri-Strips for skin reapproximation.  There were no complications.    The patient was stable at this point and subsequently transferred back to the recovery room in stable condition.    Ruiz Hermosillo MD    3/25/2025    13:40 EDT

## 2025-03-25 NOTE — ANESTHESIA PROCEDURE NOTES
Airway  Reason: elective    Date/Time: 3/25/2025 12:50 PM  Airway not difficult    General Information and Staff    Patient location during procedure: OR  CRNA/CAA: Gianni Barnard CRNA    Indications and Patient Condition  Indications for airway management: airway protection    Preoxygenated: yes    Mask difficulty assessment: 1 - vent by mask    Final Airway Details    Final airway type: endotracheal airway      Successful airway: ETT  Cuffed: yes   Successful intubation technique: direct laryngoscopy  Endotracheal tube insertion site: oral  Blade: Figueroa  Blade size: 2  ETT size (mm): 7.5  Cormack-Lehane Classification: grade I - full view of glottis  Placement verified by: chest auscultation and capnometry   Measured from: lips  ETT/EBT  to lips (cm): 23  Number of attempts at approach: 1  Assessment: lips, teeth, and gum same as pre-op and atraumatic intubation

## 2025-03-25 NOTE — ANESTHESIA PREPROCEDURE EVALUATION
Anesthesia Evaluation     Patient summary reviewed and Nursing notes reviewed   no history of anesthetic complications:   NPO Solid Status: > 8 hours  NPO Liquid Status: > 8 hours           Airway   Mallampati: II  TM distance: <3 FB  Possible difficult intubation, Difficult intubation highly probable and Large neck circumference  Dental    (+) edentulous    Pulmonary    (+) a smoker (quit 11 years ago) Former, COPD mild, asthma,shortness of breath, sleep apnea, decreased breath sounds  Cardiovascular - normal exam  Exercise tolerance: good (4-7 METS)    (+) NUNEZ, PVD  (-) hypertension, valvular problems/murmurs, past MI, cardiac stents, hyperlipidemia      Neuro/Psych  GI/Hepatic/Renal/Endo    (+) obesity, liver disease fatty liver disease history of elevated LFT, diabetes mellitus type 2 well controlled    Musculoskeletal     (+) arthralgias, back pain, myalgias  Abdominal  - normal exam   Substance History      OB/GYN          Other   arthritis,     ROS/Med Hx Other: Labs reviewed   2023 EKG st                Anesthesia Plan    ASA 3     general     (Risks and benefits discussed including risk of aspiration, recall and dental damage. All patient questions answered. Will continue with POC.)  intravenous induction     Anesthetic plan, risks, benefits, and alternatives have been provided, discussed and informed consent has been obtained with: patient.  Pre-procedure education provided

## 2025-03-27 ENCOUNTER — TELEPHONE (OUTPATIENT)
Dept: SURGERY | Facility: CLINIC | Age: 60
End: 2025-03-27
Payer: COMMERCIAL

## 2025-03-27 LAB — REF LAB TEST METHOD: NORMAL

## 2025-03-27 NOTE — TELEPHONE ENCOUNTER
"Pt is s/p gosia arredondo performed 03/25/2025, he called the office c/' \"constipation with no bowel movement since surgery.\"  I talked with pt in regards to using otc stool softeners and Miralax for the next couple of days and increase fluid intake.  I asked pt to call back to the office within that time if he does not have bowel movement and/or if he starts to have abdominal pain.  "

## 2025-03-31 ENCOUNTER — OFFICE VISIT (OUTPATIENT)
Dept: SURGERY | Facility: CLINIC | Age: 60
End: 2025-03-31
Payer: COMMERCIAL

## 2025-03-31 VITALS
BODY MASS INDEX: 35.63 KG/M2 | DIASTOLIC BLOOD PRESSURE: 88 MMHG | HEART RATE: 84 BPM | HEIGHT: 67 IN | SYSTOLIC BLOOD PRESSURE: 176 MMHG | OXYGEN SATURATION: 97 % | WEIGHT: 227 LBS

## 2025-03-31 DIAGNOSIS — Z48.89 POSTOPERATIVE VISIT: Primary | ICD-10-CM

## 2025-03-31 PROCEDURE — 99024 POSTOP FOLLOW-UP VISIT: CPT | Performed by: SURGERY

## 2025-05-16 ENCOUNTER — HOSPITAL ENCOUNTER (OUTPATIENT)
Dept: GENERAL RADIOLOGY | Facility: HOSPITAL | Age: 60
Discharge: HOME | End: 2025-05-16
Admitting: FAMILY MEDICINE
Payer: COMMERCIAL

## 2025-05-16 ENCOUNTER — TRANSCRIBE ORDERS (OUTPATIENT)
Dept: GENERAL RADIOLOGY | Facility: HOSPITAL | Age: 60
End: 2025-05-16
Payer: COMMERCIAL

## 2025-05-16 DIAGNOSIS — M54.51 VERTEBROGENIC LOW BACK PAIN: Primary | ICD-10-CM

## 2025-05-16 DIAGNOSIS — M54.51 VERTEBROGENIC LOW BACK PAIN: ICD-10-CM

## 2025-05-16 PROCEDURE — 72100 X-RAY EXAM L-S SPINE 2/3 VWS: CPT

## (undated) DEVICE — MONOPOLAR METZENBAUM SCISSOR, MINI BLADE TIP, DISPOSABLE: Brand: MONOPOLAR METZENBAUM SCISSOR, MINI BLADE TIP, DISPOSABLE

## (undated) DEVICE — 2.0MM SHARPTOME™ CRESCENT KNIFE ANGLED, BEVEL UP: Brand: SHARPOINT

## (undated) DEVICE — KT POSTOP CATARACT PT CARE

## (undated) DEVICE — KT CATH CHOLANGIOGRA PERC W/BALLO

## (undated) DEVICE — PK CATARACT OPTHAMALOGY

## (undated) DEVICE — COVER,C-ARM,41X74: Brand: MEDLINE

## (undated) DEVICE — CLEAR CORNEAL KNIFE 2.4MM ANG: Brand: SHARPOINT

## (undated) DEVICE — SUT VIC 0/0 UR6 27IN DYED J603H

## (undated) DEVICE — GLV SURG SENSICARE W/ALOE PF LF 7 STRL

## (undated) DEVICE — GLV SURG SENSICARE W/ALOE PF LF 7.5 STRL

## (undated) DEVICE — PREP SOL POVIDONE/IODINE BT 4OZ

## (undated) DEVICE — 0.8MM CLEARPORT PARA KNIFE: Brand: SHARPOINT

## (undated) DEVICE — HP CONCL INTREPID COAX I/A CRV .3MM

## (undated) DEVICE — SOL IRRIG NACL 9PCT 1000ML BTL

## (undated) DEVICE — 2, DISPOSABLE SUCTION/IRRIGATOR WITHOUT DISPOSABLE TIP: Brand: STRYKEFLOW

## (undated) DEVICE — SOL IRR NACL 0.9PCT BO 1000ML

## (undated) DEVICE — 3M™ STERI-DRAPE™ MEDIUM DRAPE WITH INCISE FILM AND POUCH 1061: Brand: STERI-DRAPE™

## (undated) DEVICE — UNDYED MONOFILAMENT (POLYDIOXANONE), ABSORBABLE SYNTHETIC SURGICAL SUTURE: Brand: PDS

## (undated) DEVICE — RICH GENERAL LAPAROSCOPY: Brand: MEDLINE INDUSTRIES, INC.

## (undated) DEVICE — TP ELECTRD LAP L WR SPLIT33CM

## (undated) DEVICE — COVER,MAYO STAND,STERILE: Brand: MEDLINE

## (undated) DEVICE — SKIN MARKER,FINE TIP: Brand: DEVON

## (undated) DEVICE — DISPOSABLE MONOPOLAR ENDOSCOPIC CORD 10 FT. (3M): Brand: KIRWAN

## (undated) DEVICE — ENDOPATH XCEL UNIVERSAL TROCAR STABLILITY SLEEVES: Brand: ENDOPATH XCEL

## (undated) DEVICE — TOWEL,OR,DSP,ST,BLUE,STD,4/PK,20PK/CS: Brand: MEDLINE

## (undated) DEVICE — ENDOPATH XCEL BLADELESS TROCARS WITH STABILITY SLEEVES: Brand: ENDOPATH XCEL

## (undated) DEVICE — SUT PDS 5/0 P3 18IN CLR PDP493G

## (undated) DEVICE — SOL NACL 0.9PCT 1000ML

## (undated) DEVICE — GLV SURG BIOGEL M LTX PF 6 1/2

## (undated) DEVICE — SOL IRRIG H2O 1000ML STRL

## (undated) DEVICE — PATIENT RETURN ELECTRODE, SINGLE-USE, CONTACT QUALITY MONITORING, ADULT, WITH 9FT CORD, FOR PATIENTS WEIGING OVER 33LBS. (15KG): Brand: MEGADYNE

## (undated) DEVICE — SLV SCD CALF HEMOFORCE DVT THERP REPROC MD

## (undated) DEVICE — ST TBG PNEUMOCLEAR EVAC SMOKE HIFLO